# Patient Record
Sex: FEMALE | Race: WHITE | NOT HISPANIC OR LATINO | Employment: UNEMPLOYED | URBAN - METROPOLITAN AREA
[De-identification: names, ages, dates, MRNs, and addresses within clinical notes are randomized per-mention and may not be internally consistent; named-entity substitution may affect disease eponyms.]

---
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
## 2024-01-04 ENCOUNTER — VIRTUAL VISIT (OUTPATIENT)
Dept: PSYCHOLOGY | Facility: CLINIC | Age: 19
End: 2024-01-04
Payer: COMMERCIAL

## 2024-01-04 DIAGNOSIS — F15.21 OTHER STIMULANT DEPENDENCE, IN REMISSION (H): ICD-10-CM

## 2024-01-04 DIAGNOSIS — F64.0 GENDER DYSPHORIA IN ADULT: Primary | ICD-10-CM

## 2024-01-04 DIAGNOSIS — F31.81 BIPOLAR II DISORDER, MODERATE, DEPRESSED, WITH MOOD-CONGRUENT PSYCHOTIC FEATURES, IN PARTIAL REMISSION (H): ICD-10-CM

## 2024-01-04 DIAGNOSIS — F84.0 AUTISM SPECTRUM DISORDER: ICD-10-CM

## 2024-01-04 DIAGNOSIS — F50.22 BULIMIA NERVOSA, MODERATE: ICD-10-CM

## 2024-01-04 PROCEDURE — 90837 PSYTX W PT 60 MINUTES: CPT | Mod: 93 | Performed by: SOCIAL WORKER

## 2024-01-04 NOTE — NURSING NOTE
Is the patient currently in the state of [place]? YES    Visit mode:TELEPHONE    If the visit is dropped, the patient can be reconnected by: TELEPHONE VISIT: Phone number: [phone]    Will anyone else be joining the visit? NO  (If patient encounters technical issues they should call [phone]    How would you like to obtain your AVS? MyChart    Are changes needed to the allergy or medication list? N/A    Reason for visit: RECHECK    Carrie JAMESON

## 2024-01-04 NOTE — PROGRESS NOTES
"Virtual Visit Details    Type of service:  Telephone Visit   Phone call duration: 53 minutes     [facility] - Progress Note    Date of Service: 24   Name: Reji Lindsey  : 2005  Medical Record Number: [id number]  Treating Provider: SATHISH Wright  Type of Session: Individual  Present in Session: Client  Session Start and Stop Time: 2:00 PM-2:53 PM  Number of Minutes:  53    SERVICE MODALITY:  Phone Visit:      Provider verified identity through the following two step process.  Patient provided:  Patient is known previously to provider    Telephone Visit: The patient's condition can be safely assessed and treated via synchronous audio telemedicine encounter.      Reason for Audio Telemedicine Visit: Patient has requested telehealth visit    Originating Site (Patient Location): Patient's home    Distant Site (Provider Location): [facility]    Consent:  The patient/guardian has verbally consented to:     1. The potential risks and benefits of telemedicine (telephone visit) versus in person care;    The patient has been notified of the following:      \"We have found that certain health care needs can be provided without the need for a face to face visit.  This service lets us provide the care you need with a phone conversation.       I will have full access to your [facility] medical record during this entire phone call.   I will be taking notes for your medical record.      Since this is like an office visit, we will bill your insurance company for this service.       There are potential benefits and risks of telephone visits (e.g. limits to patient confidentiality) that differ from in-person visits.?Confidentiality still applies for telephone services, and nobody will record the visit.  It is important to be in a quiet, private space that is free of distractions (including cell phone or other devices) during the visit.?? " "     If during the course of the call I believe a telephone visit is not appropriate, you will not be charged for this service\"     Consent has been obtained for this service by care team member: Yes     DSM-5 Diagnoses:  Diagnoses         Codes Comments    Gender dysphoria in adult    -  Primary F64.0     Bipolar II disorder, moderate, depressed, with mood-congruent psychotic features, in partial remission (H)     F31.81     Other stimulant dependence, in remission (H)     F15.21     Bulimia nervosa, moderate (H28)     F50.2     Autism spectrum disorder     F84.0             Current Reported Symptoms and Status update:  Client is a 18 year old elise individual assigned female at birth who identifies as transgender and non-binary, who has the following ethnic and Lutheran identities:  and \"trying to figure out spirituality, like the higher power concept in [Alcoholics Anonymous.\"     Client endorses the following symptoms since 2015: marked incongruence between one's experienced gender and primary and/or secondary sex characteristics, strong desire to be rid of his primary and/or secondary sex characteristics because of marked incongruence between his experienced gender, a strong desire to be of the other gender, and a strong desire to be treated as the other gender.     Client endorses experiencing the following symptoms since October 2022 for nearly every day: low mood, markedly diminished interest in previously enjoyed activities, eating difficulties, sleep difficulties, fatigue, feelings of worthlessness, difficulty concentrating, and recurrent suicidal ideation along with feeling tense, difficulty concentrating because of worry, fear that something awful might happen, and fear of losing control of himself.  Client also reports experiencing auditory and visual hallucinations both while using substances and while maintaining sobriety, specifically during depressive episodes.  Client also reports " "historically experiencing a distinct period of abnormally and persistently elevated mood lasting for a week that were not severe enough to cause marked impairment with the following symptoms appearing: inflated self-esteem, decreased need for sleep, pressured speech, flight of ideas, increase in goal directed activity, and excessive involvement in activities that have a high potential for painful consequences.    Client also reports a recent history of stimulant use and the following symptoms: persistent desire and unsuccessful attempts to cut down or control use, cravings, recurring stimulant use resulting in failure to fulfill role obligations, continued use despite persistent and recurrent problems, physically hazardous use, and tolerance.     Client also reports recurrent episodes of binge eating and recurrent inappropriate compensatory behaviors in order to prevent weight gain that have occurred since 2016 at least once per week.    Client also reports being diagnosed with Autism Spectrum Disorder in Spring 2023 by psychologist through the [facility].    Changes since last session: Client reports noticing some \"restlessness which kind of feels a bit depressing\" along with navigating transitions with their new sponsor at  and feeling disconnected from sexuality.    Progress Toward Treatment Goals:   Satisfactory progress   Treatment plan update next due 5/28/2024  Diagnostic update next due 11/28/2024  Therapeutic Interventions/Treatment Strategies:    Area(s) of treatment focus addressed in this session included Symptom Management, Interpersonal Relationship Skills, Sexual Health and Wellness, and Develop / Improve Independent Living Skills    Therapist created a validating and empathic space for Client to process thoughts and feelings regarding low mood, difficulties with AA sponsor, and connection to sexuality.  Therapist supported Client how recent traumatic events have impacted Client's ability to " engage sexually and how that it is okay for Client to pause on connecting sexually with others.  Therapist and Client also explored intersection of gender dysphoria and being in a male-only AA space, and ways for Client to center their needs as a non-binary person.    Psychotherapist offered support, feedback and validation and reinforced use of skills Treatment modalities used include Dialectical Behavioral Therapy Systemic Relational Therapy Gender Affirming Care Emotionally Focused Therapy Emotions Management:  Discussed barriers to emotional regulation and Increased awareness of daily mood patterns/changes, Other: Assisted patient  in finding ways to adapt functioning in light of past traumatic experiences, Relationship Skills: Assisted patients in implementing more effective communication skills in their relationships, and facilitated discussion about sexual health and wellness and explored challenging unrealistic expectations of self/others  Support, Feedback, Problem Solving, Clarification, and Education    Patient Response:   Patient responded to session by accepting feedback, giving feedback, listening, focusing on goals, accepting support, verbalizing understanding, and actively engaged  Possible barriers to participation / learning include: contextual issues, system oppression, and political/world events worsening symptoms    Current Mental Status Exam:   Appearance:  NA phone appt   Eye Contact:  NA phone appt   Attitude / Demeanor: Cooperative  Interested Friendly Pleasant  Speech      Rate / Production: Normal/ Responsive      Volume:  Normal  volume  Orientation:  All  Mood:   Sad  Grieving  Affect:   Appropriate   Thought Content: Clear   Insight:   Good       Plan/Need for Future Services:  Return for therapy in 1 week to treat diagnosed problems.    Patient has a current master individualized treatment plan.  See Epic treatment plan for more information.    Referral / Collaboration:  Referral to  another professional/service is not indicated at this time..  Emergency Services Needed?  No    Assignment:  Continue to explore stressors and transitions along with impact of gender dysphoria upon mental health symptoms as well as practicing distress tolerance skills    Interactive Complexity:  There are four specific communication difficulties that complicate the work of the primary psychiatric procedure.  Interactive complexity (+48535) may be reported when at least one of these difficulties is present.    Communication difficulties present during current the psychiatric procedure include:  None.          Hermelinda Rico, LESTERSW

## 2024-01-11 ENCOUNTER — VIRTUAL VISIT (OUTPATIENT)
Dept: PSYCHOLOGY | Facility: CLINIC | Age: 19
End: 2024-01-11
Payer: COMMERCIAL

## 2024-01-11 DIAGNOSIS — F31.81 BIPOLAR II DISORDER, MODERATE, DEPRESSED, WITH MOOD-CONGRUENT PSYCHOTIC FEATURES, IN PARTIAL REMISSION (H): ICD-10-CM

## 2024-01-11 DIAGNOSIS — F50.22 BULIMIA NERVOSA, MODERATE: ICD-10-CM

## 2024-01-11 DIAGNOSIS — F15.21 OTHER STIMULANT DEPENDENCE, IN REMISSION (H): ICD-10-CM

## 2024-01-11 DIAGNOSIS — F64.0 GENDER DYSPHORIA IN ADULT: Primary | ICD-10-CM

## 2024-01-11 DIAGNOSIS — F84.0 AUTISM SPECTRUM DISORDER: ICD-10-CM

## 2024-01-11 PROCEDURE — 90837 PSYTX W PT 60 MINUTES: CPT | Mod: 93 | Performed by: SOCIAL WORKER

## 2024-01-11 NOTE — NURSING NOTE
Is the patient currently in the state of [place]? YES    Visit mode:TELEPHONE    If the visit is dropped, the patient can be reconnected by: TELEPHONE VISIT: Phone number:   Telephone Information:   Mobile [phone]   Mobile Not on file.       Will anyone else be joining the visit? No  (If patient encounters technical issues they should call [phone])    How would you like to obtain your AVS? MyChart    Are changes needed to the allergy or medication list? N/A    Rooming Documentation: Questionnaire(s) not done per department protocol.    Reason for visit: RECHECK     GISELL Wilson

## 2024-01-11 NOTE — PROGRESS NOTES
"Virtual Visit Details    Type of service:  Telephone Visit   Phone call duration: 53 minutes   [facility] - Progress Note    Date of Service: 24   Name: Reji Lindsey  : 2005  Medical Record Number: [id number]  Treating Provider: SATHISH Wright   Type of Session: Individual  Present in Session: Client  Session Start and Stop Time: 2:00 PM-2:53 PM  Number of Minutes:  53    SERVICE MODALITY:  Phone Visit:      Provider verified identity through the following two step process.  Patient provided:  Patient is known previously to provider    Telephone Visit: The patient's condition can be safely assessed and treated via synchronous audio telemedicine encounter.      Reason for Audio Telemedicine Visit: Patient has requested telehealth visit    Originating Site (Patient Location): Patient's home    Distant Site (Provider Location): Provider Remote Setting- Home Office    Consent:  The patient/guardian has verbally consented to:     1. The potential risks and benefits of telemedicine (telephone visit) versus in person care;    The patient has been notified of the following:      \"We have found that certain health care needs can be provided without the need for a face to face visit.  This service lets us provide the care you need with a phone conversation.       I will have full access to your [facility] medical record during this entire phone call.   I will be taking notes for your medical record.      Since this is like an office visit, we will bill your insurance company for this service.       There are potential benefits and risks of telephone visits (e.g. limits to patient confidentiality) that differ from in-person visits.?Confidentiality still applies for telephone services, and nobody will record the visit.  It is important to be in a quiet, private space that is free of distractions (including cell phone or other devices) during the visit.??      If during " "the course of the call I believe a telephone visit is not appropriate, you will not be charged for this service\"     Consent has been obtained for this service by care team member: Yes     DSM-5 Diagnoses:  Diagnoses         Codes Comments    Gender dysphoria in adult    -  Primary F64.0     Bipolar II disorder, moderate, depressed, with mood-congruent psychotic features, in partial remission (H)     F31.81     Other stimulant dependence, in remission (H)     F15.21     Bulimia nervosa, moderate (H28)     F50.2     Autism spectrum disorder     F84.0             Current Reported Symptoms and Status update:  Client is a 18 year old elise individual assigned female at birth who identifies as transgender and non-binary, who has the following ethnic and Yazidism identities:  and \"trying to figure out spirituality, like the higher power concept in [Alcoholics Anonymous.\"     Client endorses the following symptoms since 2015: marked incongruence between one's experienced gender and primary and/or secondary sex characteristics, strong desire to be rid of his primary and/or secondary sex characteristics because of marked incongruence between his experienced gender, a strong desire to be of the other gender, and a strong desire to be treated as the other gender.     Client endorses experiencing the following symptoms since October 2022 for nearly every day: low mood, markedly diminished interest in previously enjoyed activities, eating difficulties, sleep difficulties, fatigue, feelings of worthlessness, difficulty concentrating, and recurrent suicidal ideation along with feeling tense, difficulty concentrating because of worry, fear that something awful might happen, and fear of losing control of himself.  Client also reports experiencing auditory and visual hallucinations both while using substances and while maintaining sobriety, specifically during depressive episodes.  Client also reports historically " "experiencing a distinct period of abnormally and persistently elevated mood lasting for a week that were not severe enough to cause marked impairment with the following symptoms appearing: inflated self-esteem, decreased need for sleep, pressured speech, flight of ideas, increase in goal directed activity, and excessive involvement in activities that have a high potential for painful consequences.    Client also reports a recent history of stimulant use and the following symptoms: persistent desire and unsuccessful attempts to cut down or control use, cravings, recurring stimulant use resulting in failure to fulfill role obligations, continued use despite persistent and recurrent problems, physically hazardous use, and tolerance.     Client also reports recurrent episodes of binge eating and recurrent inappropriate compensatory behaviors in order to prevent weight gain that have occurred since 2016 at least once per week.    Client also reports being diagnosed with Autism Spectrum Disorder in Spring 2023 by psychologist through the [facility].    Changes since last session: Client reports \"avoiding talking to [their] sponsor and AA men's group\" about wanting to no longer work with them due to experiences of gender dysphoria.  Client reports beginning their menstrual cycle and reports that it has been \"way more painful since [their] miscarriage and stopping testosterone.\"    Progress Toward Treatment Goals:   Satisfactory progress   Treatment plan update next due 5/28/2024  Diagnostic update next due 11/28/2024  Therapeutic Interventions/Treatment Strategies:    Area(s) of treatment focus addressed in this session included Symptom Management, Interpersonal Relationship Skills, Gender Health, and Develop / Improve Independent Living Skills    Therapist created a validating and empathic space for Client to process thoughts and feelings regarding avoidance and fears related to talking with their sponsor.  Dyad " explored ways that Client can challenge internal dialogues related to fears of conflict as well as practicing assertive communication.  Therapist also provided a referral to OB/GYN through [facility].    Psychotherapist offered support, feedback and validation and reinforced use of skills Treatment modalities used include Dialectical Behavioral Therapy Systemic Relational Therapy Gender Affirming Care Emotionally Focused Therapy Relapse Prevention: Assisted patient in identifying the challenges and barriers to participation and attendance to support groups/community resources, Emotions Management:  Discussed barriers to emotional regulation and Increased awareness of daily mood patterns/changes, and Relationship Skills: Assisted patients in implementing more effective communication skills in their relationships  Support, Feedback, Problem Solving, Clarification, and Education    Patient Response:   Patient responded to session by accepting feedback, giving feedback, listening, focusing on goals, accepting support, verbalizing understanding, and actively engaged  Possible barriers to participation / learning include: contextual issues, system oppression, and political/world events worsening symptoms    Current Mental Status Exam:   Appearance:  NA phone appt   Eye Contact:  NA phone appt   Attitude / Demeanor: Cooperative  Interested Friendly Pleasant  Speech      Rate / Production: Normal/ Responsive      Volume:  Normal  volume  Orientation:  All  Mood:   Anxious   Affect:   Appropriate   Thought Content: Clear   Insight:   Good       Plan/Need for Future Services:  Return for therapy in 1 week to treat diagnosed problems.    Patient has a current master individualized treatment plan.  See Epic treatment plan for more information.    Referral / Collaboration:  The following referral(s) will be initiated: OB/GYN for miscarriage management/menstrual support .  Emergency Services  Needed?  No    Assignment:  Continue to explore stressors and transitions along with impact of gender dysphoria upon mental health symptoms as well as practicing distress tolerance skills    Interactive Complexity:  There are four specific communication difficulties that complicate the work of the primary psychiatric procedure.  Interactive complexity (+46860) may be reported when at least one of these difficulties is present.    Communication difficulties present during current the psychiatric procedure include:  None.          Hermelinda Rico, LESTERSW

## 2024-01-13 ENCOUNTER — OFFICE VISIT (OUTPATIENT)
Dept: URGENT CARE | Facility: URGENT CARE | Age: 19
End: 2024-01-13
Payer: COMMERCIAL

## 2024-01-13 VITALS
DIASTOLIC BLOOD PRESSURE: 53 MMHG | OXYGEN SATURATION: 99 % | TEMPERATURE: 97 F | HEART RATE: 54 BPM | SYSTOLIC BLOOD PRESSURE: 94 MMHG

## 2024-01-13 DIAGNOSIS — N30.01 ACUTE CYSTITIS WITH HEMATURIA: ICD-10-CM

## 2024-01-13 DIAGNOSIS — B96.89 BV (BACTERIAL VAGINOSIS): ICD-10-CM

## 2024-01-13 DIAGNOSIS — N76.0 BV (BACTERIAL VAGINOSIS): ICD-10-CM

## 2024-01-13 DIAGNOSIS — R30.0 DYSURIA: Primary | ICD-10-CM

## 2024-01-13 LAB
ALBUMIN UR-MCNC: NEGATIVE MG/DL
APPEARANCE UR: CLEAR
BACTERIA #/AREA URNS HPF: ABNORMAL /HPF
BILIRUB UR QL STRIP: NEGATIVE
CLUE CELLS: PRESENT
COLOR UR AUTO: YELLOW
GLUCOSE UR STRIP-MCNC: NEGATIVE MG/DL
HGB UR QL STRIP: ABNORMAL
KETONES UR STRIP-MCNC: NEGATIVE MG/DL
LEUKOCYTE ESTERASE UR QL STRIP: ABNORMAL
NITRATE UR QL: NEGATIVE
PH UR STRIP: 6 [PH] (ref 5–7)
RBC #/AREA URNS AUTO: ABNORMAL /HPF
SP GR UR STRIP: <=1.005 (ref 1–1.03)
SQUAMOUS #/AREA URNS AUTO: ABNORMAL /LPF
TRICHOMONAS, WET PREP: ABNORMAL
UROBILINOGEN UR STRIP-ACNC: 0.2 E.U./DL
WBC #/AREA URNS AUTO: ABNORMAL /HPF
WBC CLUMPS #/AREA URNS HPF: PRESENT /HPF
WBC'S/HIGH POWER FIELD, WET PREP: ABNORMAL
YEAST, WET PREP: ABNORMAL

## 2024-01-13 PROCEDURE — 87210 SMEAR WET MOUNT SALINE/INK: CPT | Performed by: PHYSICIAN ASSISTANT

## 2024-01-13 PROCEDURE — 87088 URINE BACTERIA CULTURE: CPT | Performed by: PHYSICIAN ASSISTANT

## 2024-01-13 PROCEDURE — 99204 OFFICE O/P NEW MOD 45 MIN: CPT | Performed by: PHYSICIAN ASSISTANT

## 2024-01-13 PROCEDURE — 81001 URINALYSIS AUTO W/SCOPE: CPT | Performed by: PHYSICIAN ASSISTANT

## 2024-01-13 PROCEDURE — 87086 URINE CULTURE/COLONY COUNT: CPT | Performed by: PHYSICIAN ASSISTANT

## 2024-01-13 RX ORDER — METRONIDAZOLE 500 MG/1
500 TABLET ORAL 2 TIMES DAILY
Qty: 14 TABLET | Refills: 0 | Status: CANCELLED | OUTPATIENT
Start: 2024-01-13 | End: 2024-01-20

## 2024-01-13 RX ORDER — METRONIDAZOLE 7.5 MG/G
1 GEL VAGINAL DAILY
Qty: 25 G | Refills: 0 | Status: SHIPPED | OUTPATIENT
Start: 2024-01-13 | End: 2024-01-18

## 2024-01-13 RX ORDER — NITROFURANTOIN 25; 75 MG/1; MG/1
100 CAPSULE ORAL 2 TIMES DAILY
Qty: 14 CAPSULE | Refills: 0 | Status: SHIPPED | OUTPATIENT
Start: 2024-01-13 | End: 2024-01-20

## 2024-01-13 NOTE — PROGRESS NOTES
SUBJECTIVE:   Duyen Lindsey is a 18 year old adult presenting with a chief complaint of   Chief Complaint   Patient presents with    Urinary Problem     Pt reports constant 'tension/pain', burning while urinating, urgency since this morning    Urgent Care     Pt states feeling feverish (no at home temp taken) since this morning.       She is a new patient of [facility].  Patient woke up with dysuria and chills.  No vaginal discharge.          Review of Systems    Past Medical History:   Diagnosis Date    Arrhythmia      No family history on file.  Current Outpatient Medications   Medication Sig Dispense Refill    metroNIDAZOLE (METROGEL) 0.75 % vaginal gel Place 1 applicator (5 g) vaginally daily for 5 days 25 g 0    nitroFURantoin macrocrystal-monohydrate (MACROBID) 100 MG capsule Take 1 capsule (100 mg) by mouth 2 times daily for 7 days 14 capsule 0    buPROPion (WELLBUTRIN XL) 300 MG 24 hr tablet Take 1 tablet (300 mg) by mouth every morning (Patient not taking: Reported on 1/13/2024) 30 tablet 0    hydrOXYzine (ATARAX) 25 MG tablet Take 1 tablet (25 mg) by mouth every 8 hours as needed for anxiety (Patient not taking: Reported on 1/13/2024) 30 tablet 0    melatonin 5 MG tablet Take 1 tablet (5 mg) by mouth nightly as needed for sleep (Patient not taking: Reported on 1/13/2024) 30 tablet 0    testosterone cypionate (DEPOTESTOSTERONE) 200 MG/ML injection Inject 50 mg into the muscle once a week 0.25 ml = 50 mg (Patient not taking: Reported on 1/13/2024)       Social History     Tobacco Use    Smoking status: Never    Smokeless tobacco: Never   Substance Use Topics    Alcohol use: Yes       OBJECTIVE  BP 94/53   Pulse 54   Temp 97  F (36.1  C) (Tympanic)   SpO2 99%     Physical Exam  Vitals and nursing note reviewed.   Constitutional:       Appearance: Normal appearance. He is normal weight.   Eyes:      Extraocular Movements: Extraocular movements intact.      Conjunctiva/sclera: Conjunctivae normal.    Cardiovascular:      Rate and Rhythm: Normal rate and regular rhythm.      Pulses: Normal pulses.      Heart sounds: Normal heart sounds.   Pulmonary:      Effort: Pulmonary effort is normal.      Breath sounds: Normal breath sounds.   Abdominal:      Tenderness: There is no right CVA tenderness or left CVA tenderness.   Skin:     General: Skin is warm and dry.   Neurological:      General: No focal deficit present.      Mental Status: He is alert.   Psychiatric:         Mood and Affect: Mood normal.         Behavior: Behavior normal.         Labs:  Results for orders placed or performed in visit on 01/13/24 (from the past 24 hour(s))   UA Macroscopic with reflex to Microscopic and Culture - Clinic Collect    Specimen: Urine, Midstream   Result Value Ref Range    Color Urine Yellow Colorless, Straw, Light Yellow, Yellow    Appearance Urine Clear Clear    Glucose Urine Negative Negative mg/dL    Bilirubin Urine Negative Negative    Ketones Urine Negative Negative mg/dL    Specific Gravity Urine <=1.005 1.003 - 1.035    Blood Urine Moderate (A) Negative    pH Urine 6.0 5.0 - 7.0    Protein Albumin Urine Negative Negative mg/dL    Urobilinogen Urine 0.2 0.2, 1.0 E.U./dL    Nitrite Urine Negative Negative    Leukocyte Esterase Urine Small (A) Negative   UA Microscopic with Reflex to Culture   Result Value Ref Range    Bacteria Urine Moderate (A) None Seen /HPF    RBC Urine 0-2 0-2 /HPF /HPF    WBC Urine 10-25 (A) 0-5 /HPF /HPF    Squamous Epithelials Urine Moderate (A) None Seen /LPF    WBC Clumps Urine Present (A) None Seen /HPF   Wet prep - Clinic Collect    Specimen: Vagina; Swab   Result Value Ref Range    Trichomonas Absent Absent    Yeast Absent Absent    Clue Cells Present (A) Absent    WBCs/high power field 1+ (A) None       ASSESSMENT:      ICD-10-CM    1. Dysuria  R30.0 UA Macroscopic with reflex to Microscopic and Culture - Clinic Collect     UA Microscopic with Reflex to Culture     Urine Culture      2.  BV (bacterial vaginosis)  N76.0 metroNIDAZOLE (METROGEL) 0.75 % vaginal gel    B96.89       3. Acute cystitis with hematuria  N30.01 nitroFURantoin macrocrystal-monohydrate (MACROBID) 100 MG capsule           Medical Decision Making:    Differential Diagnosis:  UTI: UTI, Dysuria, Pyelonephritis, Kidney Stone, Urethritis, and Vaginitis    Serious Comorbid Conditions:  Adult:   reviewed    PLAN:    Rx for metrogel and macrobid.  Urine culture pending.  Increase fluid intake. Discussed reasons to seek immediate medical attention - specifically, fevers or vomiting.  Finish all medications.        Followup:    If not improving or if condition worsens, follow up with your Primary Care Provider, If not improving or if conditions worsens over the next 12-24 hours, go to the Emergency Department    There are no Patient Instructions on file for this visit.

## 2024-01-14 LAB — BACTERIA UR CULT: ABNORMAL

## 2024-01-18 ENCOUNTER — VIRTUAL VISIT (OUTPATIENT)
Dept: PSYCHOLOGY | Facility: CLINIC | Age: 19
End: 2024-01-18
Payer: COMMERCIAL

## 2024-01-18 DIAGNOSIS — F15.21 OTHER STIMULANT DEPENDENCE, IN REMISSION (H): ICD-10-CM

## 2024-01-18 DIAGNOSIS — F64.0 GENDER DYSPHORIA IN ADULT: Primary | ICD-10-CM

## 2024-01-18 DIAGNOSIS — F50.22 BULIMIA NERVOSA, MODERATE: ICD-10-CM

## 2024-01-18 DIAGNOSIS — F31.81 BIPOLAR II DISORDER, MODERATE, DEPRESSED, WITH MOOD-CONGRUENT PSYCHOTIC FEATURES, IN PARTIAL REMISSION (H): ICD-10-CM

## 2024-01-18 DIAGNOSIS — F84.0 AUTISM SPECTRUM DISORDER: ICD-10-CM

## 2024-01-18 PROCEDURE — 90837 PSYTX W PT 60 MINUTES: CPT | Mod: 93 | Performed by: SOCIAL WORKER

## 2024-01-18 NOTE — PROGRESS NOTES
"Virtual Visit Details    Type of service:  Telephone Visit   Phone call duration: 53 minutes   [facility] - Progress Note    Date of Service: 24   Name: Reji Lindsey  : 2005  Medical Record Number: [id number]  Treating Provider: SATHISH Wright   Type of Session: Individual  Present in Session: Client  Session Start and Stop Time: 2:00 PM-2:53 PM  Number of Minutes:  53    SERVICE MODALITY:  Phone Visit:      Provider verified identity through the following two step process.  Patient provided:  Patient is known previously to provider    Telephone Visit: The patient's condition can be safely assessed and treated via synchronous audio telemedicine encounter.      Reason for Audio Telemedicine Visit: Patient has requested telehealth visit    Originating Site (Patient Location): Patient's home    Distant Site (Provider Location): [facility]    Consent:  The patient/guardian has verbally consented to:     1. The potential risks and benefits of telemedicine (telephone visit) versus in person care;    The patient has been notified of the following:      \"We have found that certain health care needs can be provided without the need for a face to face visit.  This service lets us provide the care you need with a phone conversation.       I will have full access to your [facility] medical record during this entire phone call.   I will be taking notes for your medical record.      Since this is like an office visit, we will bill your insurance company for this service.       There are potential benefits and risks of telephone visits (e.g. limits to patient confidentiality) that differ from in-person visits.?Confidentiality still applies for telephone services, and nobody will record the visit.  It is important to be in a quiet, private space that is free of distractions (including cell phone or other devices) during the visit.??    " "  If during the course of the call I believe a telephone visit is not appropriate, you will not be charged for this service\"     Consent has been obtained for this service by care team member: Yes     DSM-5 Diagnoses:  Diagnoses         Codes Comments    Gender dysphoria in adult    -  Primary F64.0     Bipolar II disorder, moderate, depressed, with mood-congruent psychotic features, in partial remission (H)     F31.81     Other stimulant dependence, in remission (H)     F15.21     Bulimia nervosa, moderate (H28)     F50.2     Autism spectrum disorder     F84.0             Current Reported Symptoms and Status update:  Client is a 18 year old elise individual assigned female at birth who identifies as transgender and non-binary, who has the following ethnic and Islam identities:  and \"trying to figure out spirituality, like the higher power concept in [Alcoholics Anonymous.\"     Client endorses the following symptoms since 2015: marked incongruence between one's experienced gender and primary and/or secondary sex characteristics, strong desire to be rid of his primary and/or secondary sex characteristics because of marked incongruence between his experienced gender, a strong desire to be of the other gender, and a strong desire to be treated as the other gender.     Client endorses experiencing the following symptoms since October 2022 for nearly every day: low mood, markedly diminished interest in previously enjoyed activities, eating difficulties, sleep difficulties, fatigue, feelings of worthlessness, difficulty concentrating, and recurrent suicidal ideation along with feeling tense, difficulty concentrating because of worry, fear that something awful might happen, and fear of losing control of himself.  Client also reports experiencing auditory and visual hallucinations both while using substances and while maintaining sobriety, specifically during depressive episodes.  Client also reports historically " "experiencing a distinct period of abnormally and persistently elevated mood lasting for a week that were not severe enough to cause marked impairment with the following symptoms appearing: inflated self-esteem, decreased need for sleep, pressured speech, flight of ideas, increase in goal directed activity, and excessive involvement in activities that have a high potential for painful consequences.    Client also reports a recent history of stimulant use and the following symptoms: persistent desire and unsuccessful attempts to cut down or control use, cravings, recurring stimulant use resulting in failure to fulfill role obligations, continued use despite persistent and recurrent problems, physically hazardous use, and tolerance.     Client also reports recurrent episodes of binge eating and recurrent inappropriate compensatory behaviors in order to prevent weight gain that have occurred since 2016 at least once per week.    Client also reports being diagnosed with Autism Spectrum Disorder in Spring 2023 by psychologist through the [facility].    Changes since last session: Client reports \"kind of feeling sad and grieving not really having a lot of friends right now\" outside of his romantic relationship.    Progress Toward Treatment Goals:   Satisfactory progress   Treatment plan update next due 5/28/2024  Diagnostic update next due 11/28/2024  Therapeutic Interventions/Treatment Strategies:    Area(s) of treatment focus addressed in this session included Symptom Management, Interpersonal Relationship Skills, Gender Health, and Develop / Improve Independent Living Skills    Therapist created a validating and empathic space for Client to explore thoughts and feelings related to friendships and Client's perception of interpersonal relationships.  Therapist and Client explored Client's thoughts about what friendship means to Client and ways that Client has changed over the past two years and how this can affect " relationships.    Psychotherapist offered support, feedback and validation and reinforced use of skills Treatment modalities used include Dialectical Behavioral Therapy Systemic Relational Therapy Gender Affirming Care Emotionally Focused Therapy Emotions Management:  Discussed barriers to emotional regulation and Increased awareness of daily mood patterns/changes, Relationship Skills: Assisted patients in implementing more effective communication skills in their relationships and Discussed strategies to promote healthier understanding of interpersonal relationships, and explored challenging unrealistic expectations of self/others  Support, Feedback, Problem Solving, Clarification, and Education    Patient Response:   Patient responded to session by accepting feedback, giving feedback, listening, focusing on goals, accepting support, verbalizing understanding, and actively engaged  Possible barriers to participation / learning include: contextual issues, system oppression, and political/world events worsening symptoms    Current Mental Status Exam:   Appearance:  NA phone appt   Eye Contact:  NA phone appt   Attitude / Demeanor: Cooperative  Interested Friendly Pleasant  Speech      Rate / Production: Normal/ Responsive      Volume:  Normal  volume  Orientation:  All  Mood:   Sad   Affect:   Appropriate   Thought Content: Clear   Insight:   Good       Plan/Need for Future Services:  Return for therapy in 1 week to treat diagnosed problems.    Patient has a current master individualized treatment plan.  See Epic treatment plan for more information.    Referral / Collaboration:  Referral to another professional/service is not indicated at this time..  Emergency Services Needed?  No    Assignment:  Continue to explore stressors and transitions along with impact of gender dysphoria upon mental health symptoms as well as practicing distress tolerance skills    Interactive Complexity:  There are four specific  communication difficulties that complicate the work of the primary psychiatric procedure.  Interactive complexity (+87393) may be reported when at least one of these difficulties is present.    Communication difficulties present during current the psychiatric procedure include:  None.          Hermelinda Rico, LICSW

## 2024-01-18 NOTE — NURSING NOTE
Is the patient currently in the state of [place]? YES    Visit mode:TELEPHONE    If the visit is dropped, the patient can be reconnected by: TELEPHONE VISIT: Phone number:   Telephone Information:   Mobile [phone]   Mobile Not on file.       Will anyone else be joining the visit? No  (If patient encounters technical issues they should call [phone])    How would you like to obtain your AVS? MyChart    Are changes needed to the allergy or medication list? N/A    Rooming Documentation: Questionnaire(s) not done per department protocol.    Reason for visit: RECHECK     GISELL Wilson

## 2024-01-25 ENCOUNTER — VIRTUAL VISIT (OUTPATIENT)
Dept: PSYCHOLOGY | Facility: CLINIC | Age: 19
End: 2024-01-25
Payer: COMMERCIAL

## 2024-01-25 DIAGNOSIS — F50.22 BULIMIA NERVOSA, MODERATE: ICD-10-CM

## 2024-01-25 DIAGNOSIS — F64.0 GENDER DYSPHORIA IN ADULT: Primary | ICD-10-CM

## 2024-01-25 DIAGNOSIS — F31.81 BIPOLAR II DISORDER, MODERATE, DEPRESSED, WITH MOOD-CONGRUENT PSYCHOTIC FEATURES, IN PARTIAL REMISSION (H): ICD-10-CM

## 2024-01-25 DIAGNOSIS — F15.21 OTHER STIMULANT DEPENDENCE, IN REMISSION (H): ICD-10-CM

## 2024-01-25 DIAGNOSIS — F84.0 AUTISM SPECTRUM DISORDER: ICD-10-CM

## 2024-01-25 PROCEDURE — 90837 PSYTX W PT 60 MINUTES: CPT | Mod: 93 | Performed by: SOCIAL WORKER

## 2024-01-25 NOTE — PROGRESS NOTES
"Virtual Visit Details    Type of service:  Telephone Visit   Phone call duration: 53 minutes   [place] [facility] - Progress Note    Date of Service: 24   Name: Reji Lindsey  : 2005  Medical Record Number: [id number]  Treating Provider: SATHISH Wright   Type of Session: Individual  Present in Session: Client  Session Start and Stop Time: 2:00 PM-2:53 PM  Number of Minutes:  53    SERVICE MODALITY:  Phone Visit:      Provider verified identity through the following two step process.  Patient provided:  Patient is known previously to provider    Telephone Visit: The patient's condition can be safely assessed and treated via synchronous audio telemedicine encounter.      Reason for Audio Telemedicine Visit: Patient has requested telehealth visit    Originating Site (Patient Location): Patient's home    Distant Site (Provider Location): Provider Remote Setting- Home Office    Consent:  The patient/guardian has verbally consented to:     1. The potential risks and benefits of telemedicine (telephone visit) versus in person care;    The patient has been notified of the following:      \"We have found that certain health care needs can be provided without the need for a face to face visit.  This service lets us provide the care you need with a phone conversation.       I will have full access to your [facility] medical record during this entire phone call.   I will be taking notes for your medical record.      Since this is like an office visit, we will bill your insurance company for this service.       There are potential benefits and risks of telephone visits (e.g. limits to patient confidentiality) that differ from in-person visits.?Confidentiality still applies for telephone services, and nobody will record the visit.  It is important to be in a quiet, private space that is free of distractions (including cell phone or other devices) during the visit.??      If during " "the course of the call I believe a telephone visit is not appropriate, you will not be charged for this service\"     Consent has been obtained for this service by care team member: Yes     DSM-5 Diagnoses:  Diagnoses         Codes Comments    Gender dysphoria in adult    -  Primary F64.0     Bipolar II disorder, moderate, depressed, with mood-congruent psychotic features, in partial remission (H)     F31.81     Other stimulant dependence, in remission (H)     F15.21     Bulimia nervosa, moderate (H28)     F50.2     Autism spectrum disorder     F84.0             Current Reported Symptoms and Status update:  Client is a 18 year old elise individual assigned female at birth who identifies as transgender and non-binary, who has the following ethnic and Oriental orthodox identities:  and \"trying to figure out spirituality, like the higher power concept in [Alcoholics Anonymous.\"     Client endorses the following symptoms since 2015: marked incongruence between one's experienced gender and primary and/or secondary sex characteristics, strong desire to be rid of his primary and/or secondary sex characteristics because of marked incongruence between his experienced gender, a strong desire to be of the other gender, and a strong desire to be treated as the other gender.     Client endorses experiencing the following symptoms since October 2022 for nearly every day: low mood, markedly diminished interest in previously enjoyed activities, eating difficulties, sleep difficulties, fatigue, feelings of worthlessness, difficulty concentrating, and recurrent suicidal ideation along with feeling tense, difficulty concentrating because of worry, fear that something awful might happen, and fear of losing control of himself.  Client also reports experiencing auditory and visual hallucinations both while using substances and while maintaining sobriety, specifically during depressive episodes.  Client also reports historically " "experiencing a distinct period of abnormally and persistently elevated mood lasting for a week that were not severe enough to cause marked impairment with the following symptoms appearing: inflated self-esteem, decreased need for sleep, pressured speech, flight of ideas, increase in goal directed activity, and excessive involvement in activities that have a high potential for painful consequences.    Client also reports a recent history of stimulant use and the following symptoms: persistent desire and unsuccessful attempts to cut down or control use, cravings, recurring stimulant use resulting in failure to fulfill role obligations, continued use despite persistent and recurrent problems, physically hazardous use, and tolerance.     Client also reports recurrent episodes of binge eating and recurrent inappropriate compensatory behaviors in order to prevent weight gain that have occurred since 2016 at least once per week.    Client also reports being diagnosed with Autism Spectrum Disorder in Spring 2023 by psychologist through the [facility].    Changes since last session: Client reports contemplating ending their relationship and feeling \"really stuck\" with his partner.    Progress Toward Treatment Goals:   Satisfactory progress   Treatment plan update next due 5/28/2024  Diagnostic update next due 11/28/2024  Therapeutic Interventions/Treatment Strategies:    Area(s) of treatment focus addressed in this session included Symptom Management, Interpersonal Relationship Skills, Gender Health, and Develop / Improve Independent Living Skills    Therapist created a validating and empathic space for Client to process thoughts and feelings regarding his relationship and changes within relational dynamics.  Dyad explored boundaries and needs that Client would like to have within the relationship and the possibility of advocating for these.    Psychotherapist offered support, feedback and validation and reinforced use " of skills Treatment modalities used include Dialectical Behavioral Therapy Systemic Relational Therapy Gender Affirming Care Emotionally Focused Therapy Emotions Management:  Discussed barriers to emotional regulation and Increased awareness of daily mood patterns/changes and Relationship Skills: Assisted patients in implementing more effective communication skills in their relationships, Encouraged development and maintenance  of healthy boundaries, and Discussed strategies to promote healthier understanding of interpersonal relationships  Support, Feedback, Problem Solving, Clarification, and Education    Patient Response:   Patient responded to session by accepting feedback, giving feedback, listening, focusing on goals, accepting support, verbalizing understanding, and actively engaged  Possible barriers to participation / learning include: contextual issues, system oppression, and political/world events worsening symptoms    Current Mental Status Exam:   Appearance:  NA phone appt   Eye Contact:  NA phone appt   Attitude / Demeanor: Cooperative  Interested Friendly Pleasant  Speech      Rate / Production: Normal/ Responsive      Volume:  Normal  volume  Orientation:  All  Mood:   Anxious   Affect:   Appropriate   Thought Content: Clear   Insight:   Good       Plan/Need for Future Services:  Return for therapy in 1 week to treat diagnosed problems.    Patient has a current master individualized treatment plan.  See Epic treatment plan for more information.    Referral / Collaboration:  Referral to another professional/service is not indicated at this time..  Emergency Services Needed?  No    Assignment:  Continue to explore stressors and transitions along with impact of gender dysphoria upon mental health symptoms as well as practicing distress tolerance skills    Interactive Complexity:  There are four specific communication difficulties that complicate the work of the primary psychiatric procedure.   Interactive complexity (+60809) may be reported when at least one of these difficulties is present.    Communication difficulties present during current the psychiatric procedure include:  None.        Hermelinda Rico, LICSW

## 2024-01-25 NOTE — NURSING NOTE
Is the patient currently in the state of [place]? YES    Visit mode:TELEPHONE    If the visit is dropped, the patient can be reconnected by: TELEPHONE VISIT: Phone number:   Telephone Information:   Mobile [phone]   Mobile Not on file.       Will anyone else be joining the visit? No  (If patient encounters technical issues they should call [phone])    How would you like to obtain your AVS? MyChart    Are changes needed to the allergy or medication list? N/A    Rooming Documentation: Questionnaire(s) not done per department protocol.    Reason for visit: RECHECK     GISELL Wilson

## 2024-02-01 ENCOUNTER — VIRTUAL VISIT (OUTPATIENT)
Dept: PSYCHOLOGY | Facility: CLINIC | Age: 19
End: 2024-02-01
Payer: COMMERCIAL

## 2024-02-01 DIAGNOSIS — F31.81 BIPOLAR II DISORDER, MODERATE, DEPRESSED, WITH MOOD-CONGRUENT PSYCHOTIC FEATURES, IN PARTIAL REMISSION (H): ICD-10-CM

## 2024-02-01 DIAGNOSIS — F84.0 AUTISM SPECTRUM DISORDER: ICD-10-CM

## 2024-02-01 DIAGNOSIS — F64.0 GENDER DYSPHORIA IN ADULT: Primary | ICD-10-CM

## 2024-02-01 DIAGNOSIS — F50.22 BULIMIA NERVOSA, MODERATE: ICD-10-CM

## 2024-02-01 DIAGNOSIS — F15.21 OTHER STIMULANT DEPENDENCE, IN REMISSION (H): ICD-10-CM

## 2024-02-01 PROCEDURE — 90837 PSYTX W PT 60 MINUTES: CPT | Mod: 93 | Performed by: SOCIAL WORKER

## 2024-02-01 NOTE — PROGRESS NOTES
"[facility] - Progress Note    Date of Service: 24   Name: Reji Lindsey  : 2005  Medical Record Number: [id number]  Treating Provider: SATHISH Wright   Type of Session: Individual  Present in Session: Client  Session Start and Stop Time: 2:00 PM-2:53 PM  Number of Minutes:  53    SERVICE MODALITY:  Phone Visit:      Provider verified identity through the following two step process.  Patient provided:  Patient is known previously to provider    Telephone Visit: The patient's condition can be safely assessed and treated via synchronous audio telemedicine encounter.      Reason for Audio Telemedicine Visit: Patient has requested telehealth visit    Originating Site (Patient Location): Patient's home    Distant Site (Provider Location): Provider Remote Setting- Home Office    Consent:  The patient/guardian has verbally consented to:     1. The potential risks and benefits of telemedicine (telephone visit) versus in person care;    The patient has been notified of the following:      \"We have found that certain health care needs can be provided without the need for a face to face visit.  This service lets us provide the care you need with a phone conversation.       I will have full access to your [facility] medical record during this entire phone call.   I will be taking notes for your medical record.      Since this is like an office visit, we will bill your insurance company for this service.       There are potential benefits and risks of telephone visits (e.g. limits to patient confidentiality) that differ from in-person visits.?Confidentiality still applies for telephone services, and nobody will record the visit.  It is important to be in a quiet, private space that is free of distractions (including cell phone or other devices) during the visit.??      If during the course of the call I believe a telephone visit is not appropriate, you will not be charged " "for this service\"     Consent has been obtained for this service by care team member: Yes     DSM-5 Diagnoses:  Diagnoses         Codes Comments    Gender dysphoria in adult    -  Primary F64.0     Bipolar II disorder, moderate, depressed, with mood-congruent psychotic features, in partial remission (H)     F31.81     Other stimulant dependence, in remission (H)     F15.21     Bulimia nervosa, moderate (H28)     F50.2     Autism spectrum disorder     F84.0             Current Reported Symptoms and Status update:  Client is a 18 year old elise individual assigned female at birth who identifies as transgender and non-binary, who has the following ethnic and Mu-ism identities:  and \"trying to figure out spirituality, like the higher power concept in [Alcoholics Anonymous.\"     Client endorses the following symptoms since 2015: marked incongruence between one's experienced gender and primary and/or secondary sex characteristics, strong desire to be rid of his primary and/or secondary sex characteristics because of marked incongruence between his experienced gender, a strong desire to be of the other gender, and a strong desire to be treated as the other gender.     Client endorses experiencing the following symptoms since October 2022 for nearly every day: low mood, markedly diminished interest in previously enjoyed activities, eating difficulties, sleep difficulties, fatigue, feelings of worthlessness, difficulty concentrating, and recurrent suicidal ideation along with feeling tense, difficulty concentrating because of worry, fear that something awful might happen, and fear of losing control of himself.  Client also reports experiencing auditory and visual hallucinations both while using substances and while maintaining sobriety, specifically during depressive episodes.  Client also reports historically experiencing a distinct period of abnormally and persistently elevated mood lasting for a week that were " "not severe enough to cause marked impairment with the following symptoms appearing: inflated self-esteem, decreased need for sleep, pressured speech, flight of ideas, increase in goal directed activity, and excessive involvement in activities that have a high potential for painful consequences.    Client also reports a recent history of stimulant use and the following symptoms: persistent desire and unsuccessful attempts to cut down or control use, cravings, recurring stimulant use resulting in failure to fulfill role obligations, continued use despite persistent and recurrent problems, physically hazardous use, and tolerance.     Client also reports recurrent episodes of binge eating and recurrent inappropriate compensatory behaviors in order to prevent weight gain that have occurred since 2016 at least once per week.    Client also reports being diagnosed with Autism Spectrum Disorder in Spring 2023 by psychologist through the [facility].    Changes since last session: Client reports noticing \"big depressive waves\" presenting to Client that begin while Client is eating lunch, connected to disordered eating patterns.    Progress Toward Treatment Goals:   Satisfactory progress   Treatment plan update next due 5/28/2024  Diagnostic update next due 11/28/2024  Therapeutic Interventions/Treatment Strategies:    Area(s) of treatment focus addressed in this session included Symptom Management, Interpersonal Relationship Skills, Gender Health, and Develop / Improve Independent Living Skills    Therapist created a validating and empathic space for Client to process thoughts and feelings related to depressive symptoms and Client's response to himself in regards to noticing these triggers.  Using a self-compassionate lens, therapist supported Client in noticing \"all or nothing\" thinking patterns in relation to growth and view of self.  Dyad also explored root of perfectionism together in session.    Psychotherapist " offered support, feedback and validation and reinforced use of skills Treatment modalities used include Dialectical Behavioral Therapy Systemic Relational Therapy Gender Affirming Care Emotionally Focused Therapy Cognitive Restructuring:  Assisted patient in formulating new neutral/positive alternatives to challenge less helpful / ineffective thoughts, Emotions Management:  Discussed barriers to emotional regulation and Increased awareness of daily mood patterns/changes, and explored challenging unrealistic expectations of self/others  Support, Feedback, Problem Solving, Clarification, and Education    Patient Response:   Patient responded to session by accepting feedback, giving feedback, listening, focusing on goals, accepting support, verbalizing understanding, and actively engaged  Possible barriers to participation / learning include: contextual issues, system oppression, and political/world events worsening symptoms    Current Mental Status Exam:   Appearance:  NA phone appt   Eye Contact:  NA phone appt   Attitude / Demeanor: Cooperative  Interested Friendly Pleasant  Speech      Rate / Production: Normal/ Responsive      Volume:  Normal  volume  Orientation:  All  Mood:   Depressed   Affect:   Appropriate   Thought Content: Clear   Insight:   Good       Plan/Need for Future Services:  Return for therapy in 1 week to treat diagnosed problems.    Patient has a current master individualized treatment plan.  See Epic treatment plan for more information.    Referral / Collaboration:  Referral to another professional/service is not indicated at this time..  Emergency Services Needed?  No    Assignment:  Continue to explore stressors and transitions along with impact of gender dysphoria upon mental health symptoms as well as practicing distress tolerance skills    Interactive Complexity:  There are four specific communication difficulties that complicate the work of the primary psychiatric procedure.  Interactive  complexity (+[id number]) may be reported when at least one of these difficulties is present.    Communication difficulties present during current the psychiatric procedure include:  None.      .    Hermelinda Rico, [facility]       Virtual Visit Details    Type of service:  Telephone Visit   Phone call duration: 53 minutes

## 2024-02-01 NOTE — NURSING NOTE
Is the patient currently in the state of [place]? YES    Visit mode:TELEPHONE    If the visit is dropped, the patient can be reconnected by: TELEPHONE VISIT: Phone number: [phone]    Will anyone else be joining the visit? No  (If patient encounters technical issues they should call [phone])    How would you like to obtain your AVS? MyChart    Are changes needed to the allergy or medication list? N/A    Rooming Documentation: Questionnaire(s) not done per department protocol.    Reason for visit: GISELL Vera

## 2024-02-06 ENCOUNTER — VIRTUAL VISIT (OUTPATIENT)
Dept: PSYCHOLOGY | Facility: CLINIC | Age: 19
End: 2024-02-06
Payer: COMMERCIAL

## 2024-02-06 DIAGNOSIS — F50.22 BULIMIA NERVOSA, MODERATE: ICD-10-CM

## 2024-02-06 DIAGNOSIS — F84.0 AUTISM SPECTRUM DISORDER: ICD-10-CM

## 2024-02-06 DIAGNOSIS — F15.21 OTHER STIMULANT DEPENDENCE, IN REMISSION (H): ICD-10-CM

## 2024-02-06 DIAGNOSIS — F64.0 GENDER DYSPHORIA IN ADULT: Primary | ICD-10-CM

## 2024-02-06 DIAGNOSIS — F31.81 BIPOLAR II DISORDER, MODERATE, DEPRESSED, WITH MOOD-CONGRUENT PSYCHOTIC FEATURES, IN PARTIAL REMISSION (H): ICD-10-CM

## 2024-02-06 PROCEDURE — 90837 PSYTX W PT 60 MINUTES: CPT | Mod: 93 | Performed by: SOCIAL WORKER

## 2024-02-06 NOTE — NURSING NOTE
Is the patient currently in the state of [place]? YES    Visit mode:TELEPHONE    If the visit is dropped, the patient can be reconnected by: TELEPHONE VISIT: Phone number:   Telephone Information:   Mobile [phone]   Mobile Not on file.       Will anyone else be joining the visit? No  (If patient encounters technical issues they should call [phone])    How would you like to obtain your AVS? MyChart    Are changes needed to the allergy or medication list? N/A    Rooming Documentation: Questionnaire(s) not assigned, not done per department protocol.    Reason for visit: [facility]

## 2024-02-06 NOTE — PROGRESS NOTES
"[facility] - Progress Note    Date of Service: 24   Name: Reji Lindsey  : 2005  Medical Record Number: [id number]  Treating Provider: SATHISH Wright  Type of Session: Individual  Present in Session: Client  Session Start and Stop Time: 9:00 AM-9:53 AM  Number of Minutes:  53    SERVICE MODALITY:  Phone Visit:      Provider verified identity through the following two step process.  Patient provided:  Patient is known previously to provider    Telephone Visit: The patient's condition can be safely assessed and treated via synchronous audio telemedicine encounter.      Reason for Audio Telemedicine Visit: Patient has requested telehealth visit    Originating Site (Patient Location): Patient's home    Distant Site (Provider Location): [facility]    Consent:  The patient/guardian has verbally consented to:     1. The potential risks and benefits of telemedicine (telephone visit) versus in person care;    The patient has been notified of the following:      \"We have found that certain health care needs can be provided without the need for a face to face visit.  This service lets us provide the care you need with a phone conversation.       I will have full access to your [facility] medical record during this entire phone call.   I will be taking notes for your medical record.      Since this is like an office visit, we will bill your insurance company for this service.       There are potential benefits and risks of telephone visits (e.g. limits to patient confidentiality) that differ from in-person visits.?Confidentiality still applies for telephone services, and nobody will record the visit.  It is important to be in a quiet, private space that is free of distractions (including cell phone or other devices) during the visit.??      If during the course of the call I believe a telephone visit is not appropriate, you will not " "be charged for this service\"     Consent has been obtained for this service by care team member: Yes     DSM-5 Diagnoses:  Diagnoses         Codes Comments    Gender dysphoria in adult    -  Primary F64.0     Bipolar II disorder, moderate, depressed, with mood-congruent psychotic features, in partial remission (H)     F31.81     Other stimulant dependence, in remission (H)     F15.21     Bulimia nervosa, moderate (H28)     F50.2     Autism spectrum disorder     F84.0             Current Reported Symptoms and Status update:  Client is a 18 year old elise individual assigned female at birth who identifies as transgender and non-binary, who has the following ethnic and Yazidism identities:  and \"trying to figure out spirituality, like the higher power concept in [Alcoholics Anonymous.\"     Client endorses the following symptoms since 2015: marked incongruence between one's experienced gender and primary and/or secondary sex characteristics, strong desire to be rid of his primary and/or secondary sex characteristics because of marked incongruence between his experienced gender, a strong desire to be of the other gender, and a strong desire to be treated as the other gender.     Client endorses experiencing the following symptoms since October 2022 for nearly every day: low mood, markedly diminished interest in previously enjoyed activities, eating difficulties, sleep difficulties, fatigue, feelings of worthlessness, difficulty concentrating, and recurrent suicidal ideation along with feeling tense, difficulty concentrating because of worry, fear that something awful might happen, and fear of losing control of himself.  Client also reports experiencing auditory and visual hallucinations both while using substances and while maintaining sobriety, specifically during depressive episodes.  Client also reports historically experiencing a distinct period of abnormally and persistently elevated mood lasting for a week " that were not severe enough to cause marked impairment with the following symptoms appearing: inflated self-esteem, decreased need for sleep, pressured speech, flight of ideas, increase in goal directed activity, and excessive involvement in activities that have a high potential for painful consequences.    Client also reports a recent history of stimulant use and the following symptoms: persistent desire and unsuccessful attempts to cut down or control use, cravings, recurring stimulant use resulting in failure to fulfill role obligations, continued use despite persistent and recurrent problems, physically hazardous use, and tolerance.     Client also reports recurrent episodes of binge eating and recurrent inappropriate compensatory behaviors in order to prevent weight gain that have occurred since 2016 at least once per week.    Client also reports being diagnosed with Autism Spectrum Disorder in Spring 2023 by psychologist through the [facility].    Changes since last session: Client reports reading about psychoanalysis and exploring his relationship to sexuality throughout childhood.    Progress Toward Treatment Goals:   Satisfactory progress   Treatment plan update next due 5/28/2024  Diagnostic update next due 11/28/2024  Therapeutic Interventions/Treatment Strategies:    Area(s) of treatment focus addressed in this session included Symptom Management, Interpersonal Relationship Skills, Gender Health, and Develop / Improve Independent Living Skills    Therapist created a validating and empathic space for Client to explore thoughts and feelings regarding Client's own experiences of sexuality and attachment.  Using an attachment-focused lens, therapist supported Client in expressing self-compassion towards his historical explorations of sexuality and connection to their body.    Psychotherapist offered support, feedback and validation and reinforced use of skills Treatment modalities used include  Dialectical Behavioral Therapy Systemic Relational Therapy Gender Affirming Care Emotionally Focused Therapy Emotions Management:  Discussed barriers to emotional regulation and Increased awareness of daily mood patterns/changes and discussed sexual literacy and explored challenging unrealistic expectations of self/others  Support, Feedback, Problem Solving, Clarification, and Education    Patient Response:   Patient responded to session by accepting feedback, giving feedback, listening, focusing on goals, accepting support, verbalizing understanding, and actively engaged  Possible barriers to participation / learning include: contextual issues, system oppression, and political/world events worsening symptoms    Current Mental Status Exam:   Appearance:  NA phone appt   Eye Contact:  NA phone appt   Attitude / Demeanor: Cooperative  Interested Friendly Pleasant  Speech      Rate / Production: Normal/ Responsive      Volume:  Normal  volume  Orientation:  All  Mood:   Euthymic  Affect:   Appropriate   Thought Content: Clear   Insight:   Good       Plan/Need for Future Services:  Return for therapy in 1 week to treat diagnosed problems.    Patient has a current master individualized treatment plan.  See Epic treatment plan for more information.    Referral / Collaboration:  Referral to another professional/service is not indicated at this time..  Emergency Services Needed?  No    Assignment:  Continue to explore stressors and transitions along with impact of gender dysphoria upon mental health symptoms as well as practicing distress tolerance skills    Interactive Complexity:  There are four specific communication difficulties that complicate the work of the primary psychiatric procedure.  Interactive complexity (+08147) may be reported when at least one of these difficulties is present.    Communication difficulties present during current the psychiatric procedure include:  None.          Hermelinda Rico,  [facility]         Virtual Visit Details    Type of service:  Telephone Visit   Phone call duration: 53 minutes

## 2024-02-08 ENCOUNTER — VIRTUAL VISIT (OUTPATIENT)
Dept: PSYCHOLOGY | Facility: CLINIC | Age: 19
End: 2024-02-08
Payer: COMMERCIAL

## 2024-02-08 DIAGNOSIS — F84.0 AUTISM SPECTRUM DISORDER: ICD-10-CM

## 2024-02-08 DIAGNOSIS — F15.21 OTHER STIMULANT DEPENDENCE, IN REMISSION (H): ICD-10-CM

## 2024-02-08 DIAGNOSIS — F64.0 GENDER DYSPHORIA IN ADULT: Primary | ICD-10-CM

## 2024-02-08 DIAGNOSIS — F31.81 BIPOLAR II DISORDER, MODERATE, DEPRESSED, WITH MOOD-CONGRUENT PSYCHOTIC FEATURES, IN PARTIAL REMISSION (H): ICD-10-CM

## 2024-02-08 DIAGNOSIS — F50.22 BULIMIA NERVOSA, MODERATE: ICD-10-CM

## 2024-02-08 PROCEDURE — 90837 PSYTX W PT 60 MINUTES: CPT | Mod: 93 | Performed by: SOCIAL WORKER

## 2024-02-08 NOTE — PROGRESS NOTES
"Virtual Visit Details    Type of service:  Telephone Visit   Phone call duration: 53 minutes   [place] [facility] - Progress Note    Date of Service: 24   Name: Reji Lindsey  : 2005  Medical Record Number: [id number]  Treating Provider: SATHISH Wright   Type of Session: Individual  Present in Session: Client  Session Start and Stop Time: 2:00 PM-2:53 PM  Number of Minutes:  53    SERVICE MODALITY:  Phone Visit:      Provider verified identity through the following two step process.  Patient provided:  Patient is known previously to provider    Telephone Visit: The patient's condition can be safely assessed and treated via synchronous audio telemedicine encounter.      Reason for Audio Telemedicine Visit: Patient has requested telehealth visit    Originating Site (Patient Location): Patient's home    Distant Site (Provider Location): [facility]    Consent:  The patient/guardian has verbally consented to:     1. The potential risks and benefits of telemedicine (telephone visit) versus in person care;    The patient has been notified of the following:      \"We have found that certain health care needs can be provided without the need for a face to face visit.  This service lets us provide the care you need with a phone conversation.       I will have full access to your [facility] medical record during this entire phone call.   I will be taking notes for your medical record.      Since this is like an office visit, we will bill your insurance company for this service.       There are potential benefits and risks of telephone visits (e.g. limits to patient confidentiality) that differ from in-person visits.?Confidentiality still applies for telephone services, and nobody will record the visit.  It is important to be in a quiet, private space that is free of distractions (including cell phone or other devices) during the visit.??    " "  If during the course of the call I believe a telephone visit is not appropriate, you will not be charged for this service\"     Consent has been obtained for this service by care team member: Yes     DSM-5 Diagnoses:  Diagnoses         Codes Comments    Gender dysphoria in adult    -  Primary F64.0     Bipolar II disorder, moderate, depressed, with mood-congruent psychotic features, in partial remission (H)     F31.81     Other stimulant dependence, in remission (H)     F15.21     Bulimia nervosa, moderate (H28)     F50.2     Autism spectrum disorder     F84.0             Current Reported Symptoms and Status update:  Client is a 18 year old elise individual assigned female at birth who identifies as transgender and non-binary, who has the following ethnic and [employer]ybeterian identities:  and \"trying to figure out spirituality, like the higher power concept in [Alcoholics Anonymous.\"     Client endorses the following symptoms since 2015: marked incongruence between one's experienced gender and primary and/or secondary sex characteristics, strong desire to be rid of his primary and/or secondary sex characteristics because of marked incongruence between his experienced gender, a strong desire to be of the other gender, and a strong desire to be treated as the other gender.     Client endorses experiencing the following symptoms since October 2022 for nearly every day: low mood, markedly diminished interest in previously enjoyed activities, eating difficulties, sleep difficulties, fatigue, feelings of worthlessness, difficulty concentrating, and recurrent suicidal ideation along with feeling tense, difficulty concentrating because of worry, fear that something awful might happen, and fear of losing control of himself.  Client also reports experiencing auditory and visual hallucinations both while using substances and while maintaining sobriety, specifically during depressive episodes.  Client also reports historically " "experiencing a distinct period of abnormally and persistently elevated mood lasting for a week that were not severe enough to cause marked impairment with the following symptoms appearing: inflated self-esteem, decreased need for sleep, pressured speech, flight of ideas, increase in goal directed activity, and excessive involvement in activities that have a high potential for painful consequences.    Client also reports a recent history of stimulant use and the following symptoms: persistent desire and unsuccessful attempts to cut down or control use, cravings, recurring stimulant use resulting in failure to fulfill role obligations, continued use despite persistent and recurrent problems, physically hazardous use, and tolerance.     Client also reports recurrent episodes of binge eating and recurrent inappropriate compensatory behaviors in order to prevent weight gain that have occurred since 2016 at least once per week.    Client also reports being diagnosed with Autism Spectrum Disorder in Spring 2023 by psychologist through the [facility].    Changes since last session: Client reports that his former friends have been \"really mean\" to Client and Client's partner and Client reports feeling \"so over it.\"    Progress Toward Treatment Goals:   Satisfactory progress   Treatment plan update next due 5/28/2024  Diagnostic update next due 11/28/2024  Therapeutic Interventions/Treatment Strategies:    Area(s) of treatment focus addressed in this session included Symptom Management, Interpersonal Relationship Skills, Gender Health, and Develop / Improve Independent Living Skills    Therapist created a validating and empathic space for Client to process thoughts and feelings regarding interpersonal relationship tension and conflict.  Therapist supported Client in naming his feelings regarding conflict and ways that he can set boundaries in these relationships along with listening to these " boundaries.    Psychotherapist offered support, feedback and validation and reinforced use of skills Treatment modalities used include Dialectical Behavioral Therapy Systemic Relational Therapy Gender Affirming Care Emotionally Focused Therapy Emotions Management:  Discussed barriers to emotional regulation and Increased awareness of daily mood patterns/changes, Relationship Skills: Assisted patients in implementing more effective communication skills in their relationships and Discussed relationships and ways to reduce conflict , and explored challenging unrealistic expectations of self/others  Support, Feedback, Problem Solving, Clarification, and Education    Patient Response:   Patient responded to session by accepting feedback, giving feedback, listening, focusing on goals, accepting support, verbalizing understanding, and actively engaged  Possible barriers to participation / learning include: contextual issues, system oppression, and political/world events worsening symptoms    Current Mental Status Exam:   Appearance:  NA phone appt   Eye Contact:  NA phone appt   Attitude / Demeanor: Cooperative  Interested Friendly Pleasant  Speech      Rate / Production: Normal/ Responsive      Volume:  Normal  volume  Orientation:  All  Mood:   Sad   Affect:   Appropriate   Thought Content: Clear   Insight:   Good       Plan/Need for Future Services:  Return for therapy in 1 week to treat diagnosed problems.    Patient has a current master individualized treatment plan.  See Epic treatment plan for more information.    Referral / Collaboration:  Referral to another professional/service is not indicated at this time..  Emergency Services Needed?  No    Assignment:  Continue to explore stressors and transitions along with impact of gender dysphoria upon mental health symptoms as well as practicing distress tolerance skills    Interactive Complexity:  There are four specific communication difficulties that complicate the  work of the primary psychiatric procedure.  Interactive complexity (+73312) may be reported when at least one of these difficulties is present.    Communication difficulties present during current the psychiatric procedure include:  None.          Hermelinda Rico, LESTERSW

## 2024-02-08 NOTE — NURSING NOTE
Is the patient currently in the state of [place]? YES    Visit mode:TELEPHONE    If the visit is dropped, the patient can be reconnected by: TELEPHONE VISIT: Phone number:   Telephone Information:   Mobile [phone]   Mobile Not on file.       Will anyone else be joining the visit? No  (If patient encounters technical issues they should call [phone])    How would you like to obtain your AVS? MyChart    Are changes needed to the allergy or medication list? N/A    Rooming Documentation: Questionnaire(s) not done per department protocol.    Reason for visit: RECHECK     GISELL Wilson

## 2024-02-15 ENCOUNTER — VIRTUAL VISIT (OUTPATIENT)
Dept: PSYCHOLOGY | Facility: CLINIC | Age: 19
End: 2024-02-15
Payer: COMMERCIAL

## 2024-02-15 DIAGNOSIS — F50.22 BULIMIA NERVOSA, MODERATE: ICD-10-CM

## 2024-02-15 DIAGNOSIS — F31.81 BIPOLAR II DISORDER, MODERATE, DEPRESSED, WITH MOOD-CONGRUENT PSYCHOTIC FEATURES, IN PARTIAL REMISSION (H): ICD-10-CM

## 2024-02-15 DIAGNOSIS — F15.21 OTHER STIMULANT DEPENDENCE, IN REMISSION (H): ICD-10-CM

## 2024-02-15 DIAGNOSIS — F64.0 GENDER DYSPHORIA IN ADULT: Primary | ICD-10-CM

## 2024-02-15 DIAGNOSIS — F84.0 AUTISM SPECTRUM DISORDER: ICD-10-CM

## 2024-02-15 PROCEDURE — 90837 PSYTX W PT 60 MINUTES: CPT | Mod: 93 | Performed by: SOCIAL WORKER

## 2024-02-15 NOTE — PROGRESS NOTES
"Virtual Visit Details    Type of service:  Telephone Visit   Phone call duration: 53 minutes     [place] [facility] - Progress Note    Date of Service: 2/15/24   Name: Reji Lindsey  : 2005  Medical Record Number: [id number]  Treating Provider: SATHISH Wright  Type of Session: Individual  Present in Session: Client  Session Start and Stop Time: 2:00 PM-2:53 PM  Number of Minutes:  53    SERVICE MODALITY:  Phone Visit:      Provider verified identity through the following two step process.  Patient provided:  Patient is known previously to provider    Telephone Visit: The patient's condition can be safely assessed and treated via synchronous audio telemedicine encounter.      Reason for Audio Telemedicine Visit: Patient has requested telehealth visit    Originating Site (Patient Location): Patient's home    Distant Site (Provider Location): [facility]    Consent:  The patient/guardian has verbally consented to:     1. The potential risks and benefits of telemedicine (telephone visit) versus in person care;    The patient has been notified of the following:      \"We have found that certain health care needs can be provided without the need for a face to face visit.  This service lets us provide the care you need with a phone conversation.       I will have full access to your [facility] medical record during this entire phone call.   I will be taking notes for your medical record.      Since this is like an office visit, we will bill your insurance company for this service.       There are potential benefits and risks of telephone visits (e.g. limits to patient confidentiality) that differ from in-person visits.?Confidentiality still applies for telephone services, and nobody will record the visit.  It is important to be in a quiet, private space that is free of distractions (including cell phone or other devices) during the visit.?? " "     If during the course of the call I believe a telephone visit is not appropriate, you will not be charged for this service\"     Consent has been obtained for this service by care team member: Yes     DSM-5 Diagnoses:  Diagnoses         Codes Comments    Gender dysphoria in adult    -  Primary F64.0     Bipolar II disorder, moderate, depressed, with mood-congruent psychotic features, in partial remission (H)     F31.81     Other stimulant dependence, in remission (H)     F15.21     Bulimia nervosa, moderate (H28)     F50.2     Autism spectrum disorder     F84.0             Current Reported Symptoms and Status update:  Client is a 18 year old elise individual assigned female at birth who identifies as transgender and non-binary, who has the following ethnic and Anabaptism identities:  and \"trying to figure out spirituality, like the higher power concept in [Alcoholics Anonymous.\"     Client endorses the following symptoms since 2015: marked incongruence between one's experienced gender and primary and/or secondary sex characteristics, strong desire to be rid of his primary and/or secondary sex characteristics because of marked incongruence between his experienced gender, a strong desire to be of the other gender, and a strong desire to be treated as the other gender.     Client endorses experiencing the following symptoms since October 2022 for nearly every day: low mood, markedly diminished interest in previously enjoyed activities, eating difficulties, sleep difficulties, fatigue, feelings of worthlessness, difficulty concentrating, and recurrent suicidal ideation along with feeling tense, difficulty concentrating because of worry, fear that something awful might happen, and fear of losing control of himself.  Client also reports experiencing auditory and visual hallucinations both while using substances and while maintaining sobriety, specifically during depressive episodes.  Client also reports " "historically experiencing a distinct period of abnormally and persistently elevated mood lasting for a week that were not severe enough to cause marked impairment with the following symptoms appearing: inflated self-esteem, decreased need for sleep, pressured speech, flight of ideas, increase in goal directed activity, and excessive involvement in activities that have a high potential for painful consequences.    Client also reports a recent history of stimulant use and the following symptoms: persistent desire and unsuccessful attempts to cut down or control use, cravings, recurring stimulant use resulting in failure to fulfill role obligations, continued use despite persistent and recurrent problems, physically hazardous use, and tolerance.     Client also reports recurrent episodes of binge eating and recurrent inappropriate compensatory behaviors in order to prevent weight gain that have occurred since 2016 at least once per week.    Client also reports being diagnosed with Autism Spectrum Disorder in Spring 2023 by psychologist through the [facility].    Changes since last session: Client reports ending their relationship on 2/13 with their partner of 1.5 years which has felt like \"a big relief but also really sad.\"    Progress Toward Treatment Goals:   Satisfactory progress   Treatment plan update next due 5/28/2024  Diagnostic update next due 11/28/2024  Therapeutic Interventions/Treatment Strategies:    Area(s) of treatment focus addressed in this session included Symptom Management, Interpersonal Relationship Skills, Gender Health, and Develop / Improve Independent Living Skills    Therapist created a validating and empathic space for Client to explore thoughts and feelings related to interpersonal relationships and recent transitions.  Therapist supported Client in exploring feelings of grief related to relationships and how Client can work on centering own needs along with " friendships.    Psychotherapist offered support, feedback and validation and reinforced use of skills Treatment modalities used include Dialectical Behavioral Therapy Systemic Relational Therapy Gender Affirming Care Emotionally Focused Therapy Emotions Management:  Discussed barriers to emotional regulation and Increased awareness of daily mood patterns/changes, Relationship Skills: Discussed strategies to promote healthier understanding of interpersonal relationships, and explored challenging unrealistic expectations of self/others  Support, Feedback, Problem Solving, Clarification, and Education    Patient Response:   Patient responded to session by accepting feedback, giving feedback, listening, focusing on goals, accepting support, verbalizing understanding, and actively engaged  Possible barriers to participation / learning include: contextual issues, system oppression, and political/world events worsening symptoms    Current Mental Status Exam:   Appearance:  NA phone appt   Eye Contact:  NA phone appt   Attitude / Demeanor: Cooperative  Interested Friendly Pleasant  Speech      Rate / Production: Normal/ Responsive      Volume:  Normal  volume  Orientation:  All  Mood:   Sad   Affect:   Appropriate   Thought Content: Clear   Insight:   Good       Plan/Need for Future Services:  Return for therapy in 1 week to treat diagnosed problems.    Patient has a current master individualized treatment plan.  See Epic treatment plan for more information.    Referral / Collaboration:  Referral to another professional/service is not indicated at this time..  Emergency Services Needed?  No    Assignment:  Continue to explore stressors and transitions along with impact of gender dysphoria upon mental health symptoms as well as practicing distress tolerance skills    Interactive Complexity:  There are four specific communication difficulties that complicate the work of the primary psychiatric procedure.  Interactive  complexity (+[id number]) may be reported when at least one of these difficulties is present.    Communication difficulties present during current the psychiatric procedure include:  None.        Hermelinda Rico, LICSW

## 2024-02-15 NOTE — NURSING NOTE
Is the patient currently in the state of [place]? YES    Visit mode:TELEPHONE    If the visit is dropped, the patient can be reconnected by: TELEPHONE VISIT: Phone number:   Telephone Information:   Mobile [phone]   Mobile Not on file.       Will anyone else be joining the visit? NO  (If patient encounters technical issues they should call [phone])    How would you like to obtain your AVS? MyChart    Are changes needed to the allergy or medication list? No    Reason for visit: RECHECK    Sergio JAMESON

## 2024-02-22 ENCOUNTER — VIRTUAL VISIT (OUTPATIENT)
Dept: PSYCHOLOGY | Facility: CLINIC | Age: 19
End: 2024-02-22
Payer: COMMERCIAL

## 2024-02-22 DIAGNOSIS — F64.0 GENDER DYSPHORIA IN ADULT: Primary | ICD-10-CM

## 2024-02-22 DIAGNOSIS — F15.21 OTHER STIMULANT DEPENDENCE, IN REMISSION (H): ICD-10-CM

## 2024-02-22 DIAGNOSIS — F84.0 AUTISM SPECTRUM DISORDER: ICD-10-CM

## 2024-02-22 DIAGNOSIS — F50.22 BULIMIA NERVOSA, MODERATE: ICD-10-CM

## 2024-02-22 DIAGNOSIS — F31.81 BIPOLAR II DISORDER, MODERATE, DEPRESSED, WITH MOOD-CONGRUENT PSYCHOTIC FEATURES, IN PARTIAL REMISSION (H): ICD-10-CM

## 2024-02-22 PROCEDURE — 90837 PSYTX W PT 60 MINUTES: CPT | Mod: 93 | Performed by: SOCIAL WORKER

## 2024-02-22 NOTE — PROGRESS NOTES
"Virtual Visit Details    Type of service:  Telephone Visit   Phone call duration: 53 minutes     [facility] - Progress Note    Date of Service: 24   Name: Reji Lindsey  : 2005  Medical Record Number: [id number]  Treating Provider: SATHISH Wright   Type of Session: Individual  Present in Session: Client  Session Start and Stop Time: 2:00 PM-2:53 PM  Number of Minutes:  53    SERVICE MODALITY:  Phone Visit:      Provider verified identity through the following two step process.  Patient provided:  Patient is known previously to provider    Telephone Visit: The patient's condition can be safely assessed and treated via synchronous audio telemedicine encounter.      Reason for Audio Telemedicine Visit: Patient has requested telehealth visit    Originating Site (Patient Location): Patient's home    Distant Site (Provider Location): [facility]    Consent:  The patient/guardian has verbally consented to:     1. The potential risks and benefits of telemedicine (telephone visit) versus in person care;    The patient has been notified of the following:      \"We have found that certain health care needs can be provided without the need for a face to face visit.  This service lets us provide the care you need with a phone conversation.       I will have full access to your [facility] medical record during this entire phone call.   I will be taking notes for your medical record.      Since this is like an office visit, we will bill your insurance company for this service.       There are potential benefits and risks of telephone visits (e.g. limits to patient confidentiality) that differ from in-person visits.?Confidentiality still applies for telephone services, and nobody will record the visit.  It is important to be in a quiet, private space that is free of distractions (including cell phone or other devices) during the visit.?? " "     If during the course of the call I believe a telephone visit is not appropriate, you will not be charged for this service\"     Consent has been obtained for this service by care team member: Yes     DSM-5 Diagnoses:  Diagnoses         Codes Comments    Gender dysphoria in adult    -  Primary F64.0     Bipolar II disorder, moderate, depressed, with mood-congruent psychotic features, in partial remission (H)     F31.81     Other stimulant dependence, in remission (H)     F15.21     Bulimia nervosa, moderate (H28)     F50.2     Autism spectrum disorder     F84.0             Current Reported Symptoms and Status update:  Client is a 18 year old elise individual assigned female at birth who identifies as transgender and non-binary, who has the following ethnic and Jain identities:  and \"trying to figure out spirituality, like the higher power concept in [Alcoholics Anonymous.\"     Client endorses the following symptoms since 2015: marked incongruence between one's experienced gender and primary and/or secondary sex characteristics, strong desire to be rid of his primary and/or secondary sex characteristics because of marked incongruence between his experienced gender, a strong desire to be of the other gender, and a strong desire to be treated as the other gender.     Client endorses experiencing the following symptoms since October 2022 for nearly every day: low mood, markedly diminished interest in previously enjoyed activities, eating difficulties, sleep difficulties, fatigue, feelings of worthlessness, difficulty concentrating, and recurrent suicidal ideation along with feeling tense, difficulty concentrating because of worry, fear that something awful might happen, and fear of losing control of himself.  Client also reports experiencing auditory and visual hallucinations both while using substances and while maintaining sobriety, specifically during depressive episodes.  Client also reports " "historically experiencing a distinct period of abnormally and persistently elevated mood lasting for a week that were not severe enough to cause marked impairment with the following symptoms appearing: inflated self-esteem, decreased need for sleep, pressured speech, flight of ideas, increase in goal directed activity, and excessive involvement in activities that have a high potential for painful consequences.    Client also reports a recent history of stimulant use and the following symptoms: persistent desire and unsuccessful attempts to cut down or control use, cravings, recurring stimulant use resulting in failure to fulfill role obligations, continued use despite persistent and recurrent problems, physically hazardous use, and tolerance.     Client also reports recurrent episodes of binge eating and recurrent inappropriate compensatory behaviors in order to prevent weight gain that have occurred since 2016 at least once per week.    Client also reports being diagnosed with Autism Spectrum Disorder in Spring 2023 by psychologist through the [facility].    Changes since last session: Client reports trying to \"navigate boundary stuff\" with their former partner and reports preparing to go back to school to finish HS diploma.    Progress Toward Treatment Goals:   Satisfactory progress   Treatment plan update next due 5/28/2024  Diagnostic update next due 11/28/2024  Therapeutic Interventions/Treatment Strategies:    Area(s) of treatment focus addressed in this session included Symptom Management, Interpersonal Relationship Skills, Gender Health, and Develop / Improve Independent Living Skills    Therapist created a validating and empathic space for Client to process thoughts and feelings regarding interpersonal relationships and boundaries.  Therapist and Client explored how Client has grown since their past relationship and ways for Client to explore future goals regarding school.    Psychotherapist offered " support, feedback and validation and reinforced use of skills Treatment modalities used include Dialectical Behavioral Therapy Systemic Relational Therapy Gender Affirming Care Emotionally Focused Therapy Emotions Management:  Discussed barriers to emotional regulation and Increased awareness of daily mood patterns/changes, Relationship Skills: Discussed strategies to promote healthier understanding of interpersonal relationships, and explored challenging unrealistic expectations of self/others  Support, Feedback, Problem Solving, Clarification, and Education    Patient Response:   Patient responded to session by accepting feedback, giving feedback, listening, focusing on goals, accepting support, verbalizing understanding, and actively engaged  Possible barriers to participation / learning include: contextual issues, system oppression, and political/world events worsening symptoms    Current Mental Status Exam:   Appearance:  NA phone appt   Eye Contact:  NA phone appt   Attitude / Demeanor: Cooperative  Interested Friendly Pleasant  Speech      Rate / Production: Normal/ Responsive      Volume:  Normal  volume  Orientation:  All  Mood:   Euthymic  Affect:   Appropriate   Thought Content: Clear   Insight:   Good       Plan/Need for Future Services:  Return for therapy in 1 week to treat diagnosed problems.    Patient has a current master individualized treatment plan.  See Epic treatment plan for more information.    Referral / Collaboration:  Referral to another professional/service is not indicated at this time..  Emergency Services Needed?  No    Assignment:  Continue to explore stressors and transitions along with impact of gender dysphoria upon mental health symptoms as well as practicing distress tolerance skills    Interactive Complexity:  There are four specific communication difficulties that complicate the work of the primary psychiatric procedure.  Interactive complexity (+58402) may be reported when at  least one of these difficulties is present.    Communication difficulties present during current the psychiatric procedure include:  None.          Hermelinda Rico, LICSW

## 2024-02-22 NOTE — NURSING NOTE
Is the patient currently in the state of [place]? YES    Visit mode:TELEPHONE    If the visit is dropped, the patient can be reconnected by: TELEPHONE VISIT: Phone number:   Telephone Information:   Mobile [phone]   Mobile Not on file.       Will anyone else be joining the visit? NO  (If patient encounters technical issues they should call [phone])    How would you like to obtain your AVS? MyChart    Are changes needed to the allergy or medication list? N/A    Reason for visit: RECHECK    Sergio JAMESON

## 2024-02-29 ENCOUNTER — TELEPHONE (OUTPATIENT)
Dept: PSYCHOLOGY | Facility: CLINIC | Age: 19
End: 2024-02-29
Payer: COMMERCIAL

## 2024-02-29 NOTE — TELEPHONE ENCOUNTER
Therapist called Client and left a voice message reminding him of appointment and leaving callback number.    LESTER VasquezSW

## 2024-03-07 ENCOUNTER — VIRTUAL VISIT (OUTPATIENT)
Dept: PSYCHOLOGY | Facility: CLINIC | Age: 19
End: 2024-03-07
Payer: COMMERCIAL

## 2024-03-07 DIAGNOSIS — F64.0 GENDER DYSPHORIA IN ADULT: Primary | ICD-10-CM

## 2024-03-07 DIAGNOSIS — F50.22 BULIMIA NERVOSA, MODERATE: ICD-10-CM

## 2024-03-07 DIAGNOSIS — F31.81 BIPOLAR II DISORDER, MODERATE, DEPRESSED, WITH MOOD-CONGRUENT PSYCHOTIC FEATURES, IN PARTIAL REMISSION (H): ICD-10-CM

## 2024-03-07 DIAGNOSIS — F15.21 OTHER STIMULANT DEPENDENCE, IN REMISSION (H): ICD-10-CM

## 2024-03-07 DIAGNOSIS — F84.0 AUTISM SPECTRUM DISORDER: ICD-10-CM

## 2024-03-07 PROCEDURE — 90837 PSYTX W PT 60 MINUTES: CPT | Mod: 93 | Performed by: SOCIAL WORKER

## 2024-03-07 NOTE — PROGRESS NOTES
"Virtual Visit Details    Type of service:  Telephone Visit   Phone call duration: 53 minutes   Originating Location (pt. Location): Home    Distant Location (provider location):  On-site  [facility]    Date of Service: 3/07/24   Name: Reji Lindsey  : 2005  Medical Record Number: [id number]  Treating Provider: SATHISH Wright   Type of Session: Individual  Present in Session: Client  Session Start and Stop Time: 2:00 PM-2:53 PM  Number of Minutes:  53    SERVICE MODALITY:  Phone Visit:      Provider verified identity through the following two step process.  Patient provided:  Patient is known previously to provider    Telephone Visit: The patient's condition can be safely assessed and treated via synchronous audio telemedicine encounter.      Reason for Audio Telemedicine Visit: Patient has requested telehealth visit    Originating Site (Patient Location): Patient's home    Distant Site (Provider Location): [facility]    Consent:  The patient/guardian has verbally consented to:     1. The potential risks and benefits of telemedicine (telephone visit) versus in person care;    The patient has been notified of the following:      \"We have found that certain health care needs can be provided without the need for a face to face visit.  This service lets us provide the care you need with a phone conversation.       I will have full access to your [facility] medical record during this entire phone call.   I will be taking notes for your medical record.      Since this is like an office visit, we will bill your insurance company for this service.       There are potential benefits and risks of telephone visits (e.g. limits to patient confidentiality) that differ from in-person visits.?Confidentiality still applies for telephone services, and nobody will record the visit.  It is important to be in a quiet, private " "space that is free of distractions (including cell phone or other devices) during the visit.??      If during the course of the call I believe a telephone visit is not appropriate, you will not be charged for this service\"     Consent has been obtained for this service by care team member: Yes     DSM-5 Diagnoses:  Diagnoses         Codes Comments    Gender dysphoria in adult    -  Primary F64.0     Bipolar II disorder, moderate, depressed, with mood-congruent psychotic features, in partial remission (H)     F31.81     Other stimulant dependence, in remission (H)     F15.21     Bulimia nervosa, moderate (H28)     F50.2     Autism spectrum disorder     F84.0             Current Reported Symptoms and Status update:  Client is a 18 year old elise individual assigned female at birth who identifies as transgender and non-binary, who has the following ethnic and Zoroastrianism identities:  and \"trying to figure out spirituality, like the higher power concept in [Alcoholics Anonymous.\"     Client endorses the following symptoms since 2015: marked incongruence between one's experienced gender and primary and/or secondary sex characteristics, strong desire to be rid of his primary and/or secondary sex characteristics because of marked incongruence between his experienced gender, a strong desire to be of the other gender, and a strong desire to be treated as the other gender.     Client endorses experiencing the following symptoms since October 2022 for nearly every day: low mood, markedly diminished interest in previously enjoyed activities, eating difficulties, sleep difficulties, fatigue, feelings of worthlessness, difficulty concentrating, and recurrent suicidal ideation along with feeling tense, difficulty concentrating because of worry, fear that something awful might happen, and fear of losing control of himself.  Client also reports experiencing auditory and visual hallucinations both while using substances and " "while maintaining sobriety, specifically during depressive episodes.  Client also reports historically experiencing a distinct period of abnormally and persistently elevated mood lasting for a week that were not severe enough to cause marked impairment with the following symptoms appearing: inflated self-esteem, decreased need for sleep, pressured speech, flight of ideas, increase in goal directed activity, and excessive involvement in activities that have a high potential for painful consequences.    Client also reports a recent history of stimulant use and the following symptoms: persistent desire and unsuccessful attempts to cut down or control use, cravings, recurring stimulant use resulting in failure to fulfill role obligations, continued use despite persistent and recurrent problems, physically hazardous use, and tolerance.     Client also reports recurrent episodes of binge eating and recurrent inappropriate compensatory behaviors in order to prevent weight gain that have occurred since 2016 at least once per week.    Client also reports being diagnosed with Autism Spectrum Disorder in Spring 2023 by psychologist through the [facility].    Changes since last session: Client reports having a \"confusing time\" with figuring out their relationship with their former partner and navigating creating boundaries.    Progress Toward Treatment Goals:   Satisfactory progress   Treatment plan update next due 5/28/2024  Diagnostic update next due 11/28/2024  Therapeutic Interventions/Treatment Strategies:    Area(s) of treatment focus addressed in this session included Symptom Management, Interpersonal Relationship Skills, Gender Health, and Develop / Improve Independent Living Skills    Therapist created a validating and empathic space for Client to process thoughts and feelings regarding interpersonal communication and view of self outside of relationships.  Dyad explored what boundaries mean to Client and how " Client can verbalize needs for self both in and out of relationships.    Psychotherapist offered support, feedback and validation and reinforced use of skills Treatment modalities used include Dialectical Behavioral Therapy Systemic Relational Therapy Gender Affirming Care Emotionally Focused Therapy Emotions Management:  Discussed barriers to emotional regulation and Increased awareness of daily mood patterns/changes, Other: Explored with patient how life transitions may impact mental health and functioning , Relationship Skills: Assisted patients in implementing more effective communication skills in their relationships and Encouraged development and maintenance  of healthy boundaries, and explored challenging unrealistic expectations of self/others  Support, Feedback, Problem Solving, Clarification, and Education    Patient Response:   Patient responded to session by accepting feedback, giving feedback, listening, focusing on goals, accepting support, verbalizing understanding, and actively engaged  Possible barriers to participation / learning include: contextual issues, system oppression, and political/world events worsening symptoms    Current Mental Status Exam:   Appearance:  NA phone appt   Eye Contact:  NA phone appt   Attitude / Demeanor: Cooperative  Interested Friendly Pleasant  Speech      Rate / Production: Normal/ Responsive      Volume:  Normal  volume  Orientation:  All  Mood:   Euthymic  Affect:   Appropriate   Thought Content: Clear   Insight:   Good       Plan/Need for Future Services:  Return for therapy in 1 week to treat diagnosed problems.    Patient has a current master individualized treatment plan.  See Epic treatment plan for more information.    Referral / Collaboration:  The following referral(s) was/were discussed but patient declines follow up at this time. Huy Akhtar PA-C for psychiatry support .  Emergency Services Needed?  No    Assignment:  Continue to explore stressors and  transitions along with impact of gender dysphoria upon mental health symptoms as well as practicing distress tolerance skills    Interactive Complexity:  There are four specific communication difficulties that complicate the work of the primary psychiatric procedure.  Interactive complexity (+78393) may be reported when at least one of these difficulties is present.    Communication difficulties present during current the psychiatric procedure include:  None.          Hermelinda Rico, LICSW

## 2024-03-07 NOTE — NURSING NOTE
Is the patient currently in the state of [place]? YES    Visit mode:TELEPHONE    If the visit is dropped, the patient can be reconnected by: TELEPHONE VISIT: Phone number:   Telephone Information:   Mobile [phone]   Mobile Not on file.       Will anyone else be joining the visit? No  (If patient encounters technical issues they should call [phone])    How would you like to obtain your AVS? MyChart    Are changes needed to the allergy or medication list? N/A    Rooming Documentation: Questionnaire(s) not done per department protocol.    Reason for visit: RECHECK     GISELL Wilson

## 2024-03-14 ENCOUNTER — TELEPHONE (OUTPATIENT)
Dept: PSYCHOLOGY | Facility: CLINIC | Age: 19
End: 2024-03-14
Payer: COMMERCIAL

## 2024-03-14 NOTE — TELEPHONE ENCOUNTER
Therapist called Client to remind about appointment but Client did not answer phone and Client's voice message box was unavailable.

## 2024-03-28 ENCOUNTER — TELEPHONE (OUTPATIENT)
Dept: PSYCHOLOGY | Facility: CLINIC | Age: 19
End: 2024-03-28
Payer: COMMERCIAL

## 2024-03-28 NOTE — TELEPHONE ENCOUNTER
Therapist left v/m to follow up with Client regarding scheduling and missed appointment.    Hermelinda Rico, LESTERSW

## 2024-04-11 ENCOUNTER — TELEPHONE (OUTPATIENT)
Dept: PSYCHOLOGY | Facility: CLINIC | Age: 19
End: 2024-04-11
Payer: COMMERCIAL

## 2024-04-11 NOTE — TELEPHONE ENCOUNTER
Therapist called Client 2x to check on 2 PM appointment attendance but v/m box was full and Client did not answer.      LESTER VasquezSW

## 2024-04-18 ENCOUNTER — TELEPHONE (OUTPATIENT)
Dept: PSYCHOLOGY | Facility: CLINIC | Age: 19
End: 2024-04-18

## 2024-04-18 ENCOUNTER — VIRTUAL VISIT (OUTPATIENT)
Dept: OBGYN | Facility: CLINIC | Age: 19
End: 2024-04-18
Payer: COMMERCIAL

## 2024-04-18 DIAGNOSIS — Z32.01 PREGNANCY TEST POSITIVE: Primary | ICD-10-CM

## 2024-04-18 PROCEDURE — 99207 PR NO CHARGE NURSE ONLY: CPT | Mod: 93

## 2024-04-18 NOTE — TELEPHONE ENCOUNTER
Therapist called Client 2x at 2:05 PM but Client did not answer and voicemail box was full.      Hermelinda Rico, LICSW

## 2024-04-18 NOTE — PROGRESS NOTES
Pt does not plan on continuing with pregnancy. [facility] and is going to make an appointment there. Reji is aware they can also come here if needed and to please call back if they have any difficulties making an appointment at [facility].    Karen Jean RN on 4/18/2024 at 8:41 AM

## 2024-04-25 ENCOUNTER — TELEPHONE (OUTPATIENT)
Dept: PSYCHOLOGY | Facility: CLINIC | Age: 19
End: 2024-04-25
Payer: COMMERCIAL

## 2024-04-25 NOTE — TELEPHONE ENCOUNTER
Therapist called Client 2x for appointment at 2 PM due to Client not answering phone and voicemail box being full.      Hermelinda Rico, LICSW

## 2024-06-23 ENCOUNTER — HEALTH MAINTENANCE LETTER (OUTPATIENT)
Age: 19
End: 2024-06-23

## 2024-06-26 ENCOUNTER — DOCUMENTATION ONLY (OUTPATIENT)
Dept: PSYCHOLOGY | Facility: CLINIC | Age: 19
End: 2024-06-26
Payer: COMMERCIAL

## 2024-06-26 DIAGNOSIS — F15.21 OTHER STIMULANT DEPENDENCE, IN REMISSION (H): ICD-10-CM

## 2024-06-26 DIAGNOSIS — F31.81 BIPOLAR II DISORDER, MODERATE, DEPRESSED, WITH MOOD-CONGRUENT PSYCHOTIC FEATURES, IN PARTIAL REMISSION (H): ICD-10-CM

## 2024-06-26 DIAGNOSIS — F84.0 AUTISM SPECTRUM DISORDER: ICD-10-CM

## 2024-06-26 DIAGNOSIS — F50.22 BULIMIA NERVOSA, MODERATE: ICD-10-CM

## 2024-06-26 DIAGNOSIS — F64.0 GENDER DYSPHORIA IN ADULT: Primary | ICD-10-CM

## 2024-06-26 NOTE — PROGRESS NOTES
[facility] Case Closing Summary    Name:  Reji Lindsey  Diagnosis:    Diagnoses         Codes Comments    Gender dysphoria in adult    -  Primary F64.0     Bipolar II disorder, moderate, depressed, with mood-congruent psychotic features, in partial remission (H)     F31.81     Other stimulant dependence, in remission (H)     F15.21     Bulimia nervosa, moderate (H28)     F50.2     Autism spectrum disorder     F84.0           Program: [facility]  Date of Summary: 6/26/2024  Date of Initial [facility] Appointment:  11/15/2022  Date of Last [facility] Appointment:  3/7/2024    Evaluation of Client Status:    Total number of Individual, conjoint, group sessions patient attended:  11 or more    Goals at intake were:   Prepare for ongoing therapy    At the time of discharge, the client reported the following progress:  Increased insight  Changes in behavior  Changes in cognitions  Greater emotional health    Therapist Assessment:  % of goals met:  75 %  %of symptoms reversal:  60 %  % treatment effectiveness:   75 %    Prognosis:    Good    Referred to:  Is more than welcome to return to [facility] for continuing therapeutic support along with [facility]     Electronically Signed by:    SATHISH Vasquez

## 2025-07-12 ENCOUNTER — HEALTH MAINTENANCE LETTER (OUTPATIENT)
Age: 20
End: 2025-07-12

## (undated) DEVICE — Device

## (undated) DEVICE — COVER PRB 14 X 147CM (5.5INX58

## (undated) DEVICE — KIT SURFACE ELECTRODE ENSITE PERCISION

## (undated) DEVICE — 6 FR X 110CM, 2-5-2MM SPACING, 1MM TIP, MEDIUM CURVE, INQUIRY DECAPOLAR STEERABLE EP CATH

## (undated) DEVICE — CATH EP LIVEWIRE 2MMX115CM 2-5-2 CRD-2 CURVE 401652

## (undated) DEVICE — INTRODUCER SHEATH FAST-CATH 8FRX12CM 406112

## (undated) DEVICE — 4 FR X 120CM SUPREME EP CATHETER,  QUADRIPOLAR, 5 MM, JSN CURVE, 2-5-2 SPACING

## (undated) DEVICE — 2.6M COOL POINT TUBING SET FOR IRRIGATED ABLATION (SOLD INDIVIDUALLY)

## (undated) DEVICE — PACK PEDS LEFT HEART CUSTOM SCV15OHRMH

## (undated) DEVICE — DEVICE SECUREMENT 24CML BAND P

## (undated) DEVICE — INTRODUCER SHEATH FAST-CATH CATH-LOCK 7FRX12CM 406702

## (undated) DEVICE — CATH MAPPING ADVISOR HD GRID SE D-AVHD-DF16

## (undated) RX ORDER — GLYCOPYRROLATE 0.2 MG/ML
INJECTION INTRAMUSCULAR; INTRAVENOUS
Status: DISPENSED
Start: 2019-03-20

## (undated) RX ORDER — HEPARIN SODIUM 1000 [USP'U]/ML
INJECTION, SOLUTION INTRAVENOUS; SUBCUTANEOUS
Status: DISPENSED
Start: 2019-07-26

## (undated) RX ORDER — LIDOCAINE HYDROCHLORIDE 10 MG/ML
INJECTION, SOLUTION EPIDURAL; INFILTRATION; INTRACAUDAL; PERINEURAL
Status: DISPENSED
Start: 2019-03-20

## (undated) RX ORDER — PROTAMINE SULFATE 10 MG/ML
INJECTION, SOLUTION INTRAVENOUS
Status: DISPENSED
Start: 2019-07-26

## (undated) RX ORDER — LIDOCAINE HYDROCHLORIDE 20 MG/ML
INJECTION, SOLUTION EPIDURAL; INFILTRATION; INTRACAUDAL; PERINEURAL
Status: DISPENSED
Start: 2019-03-20

## (undated) RX ORDER — BUPIVACAINE HYDROCHLORIDE 2.5 MG/ML
INJECTION, SOLUTION EPIDURAL; INFILTRATION; INTRACAUDAL
Status: DISPENSED
Start: 2019-07-26

## (undated) RX ORDER — FENTANYL CITRATE 50 UG/ML
INJECTION, SOLUTION INTRAMUSCULAR; INTRAVENOUS
Status: DISPENSED
Start: 2019-07-26

## (undated) RX ORDER — PROPOFOL 10 MG/ML
INJECTION, EMULSION INTRAVENOUS
Status: DISPENSED
Start: 2019-07-26

## (undated) RX ORDER — LIDOCAINE HYDROCHLORIDE 10 MG/ML
INJECTION, SOLUTION EPIDURAL; INFILTRATION; INTRACAUDAL; PERINEURAL
Status: DISPENSED
Start: 2019-07-26

## (undated) RX ORDER — DEXAMETHASONE SODIUM PHOSPHATE 4 MG/ML
INJECTION, SOLUTION INTRA-ARTICULAR; INTRALESIONAL; INTRAMUSCULAR; INTRAVENOUS; SOFT TISSUE
Status: DISPENSED
Start: 2019-03-20

## (undated) RX ORDER — CEFAZOLIN SODIUM 1 G/3ML
INJECTION, POWDER, FOR SOLUTION INTRAMUSCULAR; INTRAVENOUS
Status: DISPENSED
Start: 2019-07-26

## (undated) RX ORDER — PROPOFOL 10 MG/ML
INJECTION, EMULSION INTRAVENOUS
Status: DISPENSED
Start: 2019-03-20

## (undated) RX ORDER — ADENOSINE 3 MG/ML
INJECTION, SOLUTION INTRAVENOUS
Status: DISPENSED
Start: 2019-07-26

## (undated) RX ORDER — ONDANSETRON 2 MG/ML
INJECTION INTRAMUSCULAR; INTRAVENOUS
Status: DISPENSED
Start: 2019-03-20

## (undated) RX ORDER — GLYCOPYRROLATE 0.2 MG/ML
INJECTION INTRAMUSCULAR; INTRAVENOUS
Status: DISPENSED
Start: 2019-07-26

## (undated) RX ORDER — LIDOCAINE HYDROCHLORIDE 20 MG/ML
INJECTION, SOLUTION EPIDURAL; INFILTRATION; INTRACAUDAL; PERINEURAL
Status: DISPENSED
Start: 2019-07-26

## (undated) RX ORDER — FENTANYL CITRATE 50 UG/ML
INJECTION, SOLUTION INTRAMUSCULAR; INTRAVENOUS
Status: DISPENSED
Start: 2019-03-20

## (undated) RX ORDER — BUPIVACAINE HYDROCHLORIDE 2.5 MG/ML
INJECTION, SOLUTION EPIDURAL; INFILTRATION; INTRACAUDAL
Status: DISPENSED
Start: 2019-03-20

## (undated) RX ORDER — CEFAZOLIN SODIUM 1 G/3ML
INJECTION, POWDER, FOR SOLUTION INTRAMUSCULAR; INTRAVENOUS
Status: DISPENSED
Start: 2019-03-20

## (undated) RX ORDER — EPHEDRINE SULFATE 50 MG/ML
INJECTION, SOLUTION INTRAMUSCULAR; INTRAVENOUS; SUBCUTANEOUS
Status: DISPENSED
Start: 2019-07-26